# Patient Record
Sex: MALE | Race: BLACK OR AFRICAN AMERICAN | Employment: OTHER | URBAN - METROPOLITAN AREA
[De-identification: names, ages, dates, MRNs, and addresses within clinical notes are randomized per-mention and may not be internally consistent; named-entity substitution may affect disease eponyms.]

---

## 2017-12-07 PROBLEM — M54.42 LEFT-SIDED LOW BACK PAIN WITH LEFT-SIDED SCIATICA: Status: ACTIVE | Noted: 2017-04-21

## 2017-12-07 PROBLEM — R97.20 PSA ELEVATION: Status: ACTIVE | Noted: 2017-12-07

## 2017-12-07 PROBLEM — I45.2 RBBB (RIGHT BUNDLE BRANCH BLOCK WITH LEFT ANTERIOR FASCICULAR BLOCK): Status: ACTIVE | Noted: 2017-12-07

## 2018-03-05 ENCOUNTER — HOSPITAL ENCOUNTER (OUTPATIENT)
Dept: PHYSICAL THERAPY | Age: 73
Discharge: HOME | End: 2018-03-05
Payer: MEDICARE

## 2018-03-05 PROCEDURE — 97110 THERAPEUTIC EXERCISES: CPT

## 2018-03-05 PROCEDURE — G8978 MOBILITY CURRENT STATUS: HCPCS

## 2018-03-05 PROCEDURE — G8979 MOBILITY GOAL STATUS: HCPCS

## 2018-03-05 PROCEDURE — 97161 PT EVAL LOW COMPLEX 20 MIN: CPT

## 2018-03-05 NOTE — PROGRESS NOTES
PT DAILY TREATMENT NOTE/LUMBAR EVAL 3-16    Patient Name: Lacinda Essex  Date:3/5/2018  : 1945  [x]  Patient  Verified  Payor: [place] MEDICARE / Plan: [place] MEDICARE PART A & B / Product Type: Medicare /    In time:323  Out time:405  Total Treatment Time (min): 42  Total Timed Codes (min): 8  1:1 Treatment Time ( only): 8   Visit #: 1 of 10-20    Treatment Area: Pain in left leg [M79.605]  Low back pain [M54.5]  SUBJECTIVE  Pain Level (0-10 scale): 0  []constant [x]intermittent []improving []worsening [x]no change since onset  Worse with getting up from sitting , bending over, Better lying down, better  Any medication changes, allergies to medications, adverse drug reactions, diagnosis change, or new procedure performed?: [x] No    [] Yes (see summary sheet for update)  Subjective functional status/changes:     PLOF: I all areas of ADLs and activities, no AD, household chores and yard work , retired,   Limitations to PLOF:pain  Mechanism of Injury: exacerbation 2-3 months ago , insideous onset  Current symptoms/Complaints: 80 YO male diagnosed as above and with S/S consistent with above diagnosis presents to skilled outpatient PT. CCO foot numbness on the left side and leg cramps on the left side, He states the left side of the body from the waist down to the foot - lateral leg numbness feeling. exacerbation 2-3 months ago , insideous onset.  Pain today is 0/10 Worse with getting up from sitting , bending over, Better lying down, better  Previous Treatment/Compliance:aleve,   PMHx/Surgical Hx: cancer - prostate 2016 proton therapy 40 some treatments, HTN, visual impairment , pneumonia, DM   Work Hx:retired  Living Situation: 2 story house, not alone  Pt Goals: get rid of the numbness and positional back pain  Barriers: [x]pain []financial []time []transportation []other  Motivation: Good  Substance use: []Alcohol []Tobacco []other:   FABQ Score: []low []elevate  Cognition: A & O x 4 Other: OBJECTIVE/EXAMINATION  Domestic Life: retired,   Activity/Recreational Limitations: pain  Mobility: I  Self Care: I        Modality rationale:     Min Type Additional Details    [] Estim:  []Unatt       []IFC  []Premod                        []Other:  []w/ice   []w/heat  Position:  Location:    [] Estim: []Att    []TENS instruct  []NMES                    []Other:  []w/US   []w/ice   []w/heat  Position:  Location:    []  Traction: [] Cervical       []Lumbar                       [] Prone          []Supine                       []Intermittent   []Continuous Lbs:  [] before manual  [] after manual    []  Ultrasound: []Continuous   [] Pulsed                           []1MHz   []3MHz Location:  W/cm2:    []  Iontophoresis with dexamethasone         Location: [] Take home patch   [] In clinic    []  Ice     []  heat  []  Ice massage  []  Laser   []  Anodyne Position:  Location:    []  Laser with stim  []  Other: Position:  Location:    []  Vasopneumatic Device Pressure:       [] lo [] med [] hi   Temperature: [] lo [] med [] hi   [] Skin assessment post-treatment:  []intact []redness- no adverse reaction    []redness - adverse reaction:     34 min [x]Eval                  []Re-Eval       8 min Therapeutic Exercise:  [x] See flow sheet :   Rationale: increase ROM, increase strength and improve coordination to improve the patients ability to aid with increase tolerance to ADLs and activities     min Therapeutic Activity:  []  See flow sheet :   Rationale:   to improve the patients ability to       min Neuromuscular Re-education:  []  See flow sheet :   Rationale:   to improve the patients ability to      min Manual Therapy:     Rationale:  to      min Gait Training:  ___ feet with ___ device on level surfaces with ___ level of assist   Rationale:           With   [] TE   [] TA   [] neuro   [] other: Patient Education: [x] Review HEP    [] Progressed/Changed HEP based on:   [] positioning   [] body mechanics [] transfers   [] heat/ice application    [] other:      Other Objective/Functional Measures: FALLS risk is low, no AD use, no LOB noted    Physical Therapy Evaluation - Lumbar Spine ([facility])    SUBJECTIVE  Chief Complaint:    Mechanism of injury:    Symptoms:  Aggravated by:   [] Bending [] Sitting [] Standing [] Walking   [] Moving [] Cough [] Sneeze [] Valsalva   [] AM  [] PM  Lying:  [] sup   [] pro   [] sidelying   [] Other:     Eased by:    [] Bending [] Sitting [] Standing [] Walking   [] Moving [] AM  [] PM  Lying: [] sup  [] pro  [] sidelying   [] Other:     General Health:  Red Flags Indicated? [] Yes    [] No  [] Yes [] No Recent weight change (If yes, due to dieting?  [] Yes  [] No)   [] Yes [] No Weakness in legs during walking  [] Yes [] No Unremitting pain at night  [] Yes [] No Abdominal pain or problems  [] Yes [] No Rectal bleeding  [] Yes [] No Feet more cold or painful in cold weather  [] Yes [] No Menstrual irregularities  [] Yes [] No Blood or pain with urination  [] Yes [] No Dysfunction of bowel or bladder  [] Yes [] No Recent illness within past 3 weeks (i.e, cold, flu)  [] Yes [] No Numbness/tingling in buttock/genitalia region    Past History/Treatments:     Diagnostic Tests: [] Lab work [] X-rays    [] CT [] MRI     [] Other:  Results:    Functional Status  Prior level of function:As above  Present functional limitations:FOTO  What position do you sleep in?:SL R    OBJECTIVE  Posture:  Lateral Shift: [x] R    [x] L     [] +  [x] -  Kyphosis: [] Increased [] Decreased   []  WNL  Lordosis:  [] Increased [x] Decreased   [] WNL  Pelvic symmetry: [] WNL    [] Other:    Gait:  [] Normal     [] Abnormal:mild anltalgic    Active Movements: [] N/A   [] Too acute   [] Other:  ROM   AROM % PROM Comments:pain, area   Forward flexion 40-60   22cm     Extension 20-30 20 degrees     SB right 20-30 50cm     SB left 20-30 50cm     Rotation right 5-10 75%     Rotation left 5-10 100%       Repeated Movements   Effects on present pain: produces (CO), abolishes (A), increases (incr), decreases (decr), centralizes (C), peripheral (PH), no effect (NE)   Pre-Test Sx Flexion Repeated Flexion Extension Repeated Extension Repeated SBL Repeated SBR   Sitting          Standing          Lying      N/A N/A   Comments:  Side Glide:  Sustained passive positioning test:    Neuro Screen [] WNL  Myotome/Dermatome/Reflexes:  Comments:    Dural Mobility:  SLR Sitting: [] R    [] L    [] +    [] -  @ (degrees):           Supine: [] R    [] L    [] +    [] -  @ (degrees):   Slump Test: [] R    [x] L    [x] +    [] -  @ (degrees):   Prone Knee Bend: [] R    [] L    [] +    [] -     Palpation  No TTP at the B L4-5, B SIJ, B piriformis    [x] Min  [] Mod  [] Severe    Location:right SIJ STC  [] Min  [] Mod  [] Severe    Location:  [] Min  [] Mod  [] Severe    Location:    Strength   L(0-5) R (0-5) N/T   Hip Flexion (L1,2)   []   Knee Extension (L3,4)   []   Ankle Dorsiflexion (L4)   []   Great Toe Extension (L5)   []   Ankle Plantarflexion (S1)   []   Knee Flexion (S1,2)   []   Upper Abdominals   []   Lower Abdominals   []   Paraspinals   []   Back Rotators   []   Gluteus Denis   []   Other   []     Special Tests  Lumbar:  Lumb.  Compression: [] Pos  [] Neg               Lumbar Distraction:   [] Pos  [] Neg    Quadrant:  [] Pos  [] Neg   [] Flex  [] Ext    Sacroilliac:  Gaenslen's: [] R    [] L    [] +    [] -     Compression: [] +    [] -     Gapping:  [] +    [] -     Thigh Thrust: [] R    [] L    [] +    [] -     Leg Length: [] +    [] -   Position:    Crests:    ASIS:    PSIS:    Sacral Sulcus:    Mobility: Standing flex:     Sitting flex:     Supine to sit:     Prone knee bend:         Hip: Peace [place]:  [] R    [] L    [] +    [] -     Scour:  [] R    [] L    [] +    [] -     Piriformis: [] R    [x] L    [x] +    [] -          Deficits: Susanne's: [] R    [] L    [] +    [] -     Tha: [] R    [] L    [] +    [] -     Hamstrings 90/90:    Gastrocsoleus (to neutral): Right: Left:       Global Muscular Weakness:  Abdominals:  Quadratus Lumborum:  Paraspinals: Other:    Other tests/comments:FALLS RISK IS LOW< NO LOB< NO AD USE       Pain Level (0-10 scale) post treatment: 0    ASSESSMENT/Changes in Function: Patient demonstrates the potential to make gains with improved ROM, strength, endurance/activity tolerance, functional FOTO survey score   and all within a reasonable time frame so as to increase their functional independence with ADLs and activities for carryover to  Improved quality of life and tolerance to household chores and community activities. Patient requires skilled Physical Therapy so as to monitor their response to and modify their treatment plan accordingly. Patient appears to be an appropriate candidate for skilled outpatient Physical Therapy. Patient will continue to benefit from skilled PT services to modify and progress therapeutic interventions, address functional mobility deficits, address ROM deficits, address strength deficits, analyze and address soft tissue restrictions, analyze and cue movement patterns, analyze and modify body mechanics/ergonomics, assess and modify postural abnormalities and instruct in home and community integration to attain remaining goals.      [x]  See Plan of Care  []  See progress note/recertification  []  See Discharge Summary         Progress towards goals / Updated goals:       PLAN  [x]  Upgrade activities as tolerated     [x]  Continue plan of care  []  Update interventions per flow sheet       []  Discharge due to:_  []  Other:_      Aminata Larios, PT 3/5/2018  3:25 PM

## 2018-03-05 NOTE — PROGRESS NOTES
[facility] [address], [address] [place] [address], [address] [place], [place] [address]y [address]  [phone] fax      Plan of Care/ Statement of Necessity for Physical Therapy Services    Patient name: Jeff Feliciano Start of Care: 3/5/2018   Referral source: Sage Graves MD : 1945    Medical Diagnosis: Pain in left leg [M79.605]  Low back pain [M54.5]   Onset Date:2-3 months    Treatment Diagnosis: decrease tolerance to ADLs  And activities due to left LE S/S,    Prior Hospitalization: see medical history Provider#: [id number]   Medications: Verified on Patient summary List    Comorbidities: cancer - prostate 2016 proton therapy 40 some treatments, HTN, visual impairment , pneumonia, DM    Prior Level of Function: I all areas of ADLs and activities, no AD, household chores and yard work , retired,       [employer] of Care and following information is based on the information from the initial evaluation. Assessment/ key information:71 YO male diagnosed as above and with S/S consistent with above diagnosis presents to skilled outpatient PT. CCO foot numbness on the left side and leg cramps on the left side, He states the left side of the body from the waist down to the foot - lateral leg numbness feeling. exacerbation 2-3 months ago , insideous onset. Pain today is 0/10 Worse with getting up from sitting , bending over, Better lying down, better  Previous Treatment/Compliance:aleve,   Pain 0 today, FOTO leg 66, lumbar 60 gait I with no AD use, mildly antalgic appearing , + Left piriformis tension and + left sciatic nerve tension testing. No TTP B SIJ, Piriformis, LS, min STC right SIJ, - lateral shift, decreased lordosis, AROM Trunk FF 22 cm from floor, SB B 50cm, BB 20 degrees, ROT Right 75% left 100%.  Falls risk low  Patient demonstrates the potential to make gains with improved ROM, strength, endurance/activity tolerance, functional FOTO survey score   and all within a reasonable time frame so as to increase their functional independence with ADLs and activities for carryover to  Improved quality of life and tolerance to household chores and community activities. Patient requires skilled Physical Therapy so as to monitor their response to and modify their treatment plan accordingly. Patient appears to be an appropriate candidate for skilled outpatient Physical Therapy.       Evaluation Complexity History MEDIUM  Complexity : 1-2 comorbidities / personal factors will impact the outcome/ POC ; Examination MEDIUM Complexity : 3 Standardized tests and measures addressing body structure, function, activity limitation and / or participation in recreation  ;Presentation LOW Complexity : Stable, uncomplicated  ;Clinical Decision Making MEDIUM Complexity : FOTO score of 26-74  Overall Complexity Rating: LOW   Problem List: pain affecting function, decrease ROM, impaired gait/ balance, decrease ADL/ functional abilitiies, decrease activity tolerance, decrease flexibility/ joint mobility, decrease transfer abilities and other FOTO leg 66, lumbar 60   Treatment Plan may include any combination of the following: Therapeutic exercise, Therapeutic activities, Neuromuscular re-education, Physical agent/modality, Manual therapy, Patient education, Self Care training and Home safety training  Patient / Family readiness to learn indicated by: asking questions, trying to perform skills and interest  Persons(s) to be included in education: patient (P)  Barriers to Learning/Limitations: None  Patient Goal (s): get rid of the numbness and positional back pain  Patient Self Reported Health Status: good  Rehabilitation Potential: good    Short Term Goals:  To be accomplished in 5 treatments:   1 patient will have established and be I with HEP to aid with progression of skilled PT program   EVAL issued   CURRENT   2 patient will have lumbar FOTO 62 to show increase tolerance to ADLs and household chores   EVAL 60   CURRENT  Long Term Goals: To be accomplished in 10-20 treatments:            1 patient will have lumbar FOTO 65 to show increase tolerance to ADLs and household chores   EVAL 60   CURRENT    2 patient will ambulate 1/4-1/2 mile with no increase S/S left LE for carryover to community ambulation   EVAL pain with activities   CURRENT   3 patient will report overall 50% improvement to aid with increase tolerance to ADLs and activities   EVAL    CURRENT  Frequency / Duration: Patient to be seen 2-3 times per week for 10-20 treatments. Patient/ Caregiver education and instruction: Diagnosis, prognosis, self care, activity modification and exercises   [x]  Plan of care has been reviewed with PTA    G-Codes (GP)  Mobility   Current  CK= 40-59%   Goal  CJ= 20-39%     The severity rating is based on clinical judgment and the FOTO score. Certification Period: 3/5/18-5/4/18  Chip Noel, PT 3/5/2018 4:10 PM    ________________________________________________________________________    I certify that the above Therapy Services are being furnished while the patient is under my care. I agree with the treatment plan and certify that this therapy is necessary.     [de-identified] Signature:____________________  Date:____________Time: _________    Please sign and return to [facility] [address] [place] [address], [address] [place] [address], [address] [place], [place] [address]y [address] [phone] fax

## 2018-03-07 ENCOUNTER — HOSPITAL ENCOUNTER (OUTPATIENT)
Dept: PHYSICAL THERAPY | Age: 73
Discharge: HOME | End: 2018-03-07
Payer: MEDICARE

## 2018-03-07 PROCEDURE — 97140 MANUAL THERAPY 1/> REGIONS: CPT

## 2018-03-07 PROCEDURE — 97110 THERAPEUTIC EXERCISES: CPT

## 2018-03-07 NOTE — PROGRESS NOTES
PT DAILY TREATMENT NOTE - [facility]     Patient Name: Mike Patel  Date:3/7/2018  : 1945  [x]  Patient  Verified  Payor: [place] MEDICARE / Plan: [place] MEDICARE PART A & B / Product Type: Medicare /    In time:2:50  Out time:3:59  Total Treatment Time (min): 55  Total Timed Codes (min): 45  1:1 Treatment Time ( [address] only): 45  Visit #: 2 of 10-20    Treatment Area: Pain in left leg [M79.605]  Low back pain [M54.5]    SUBJECTIVE  Pain Level (0-10 scale): 2-3  Any medication changes, allergies to medications, adverse drug reactions, diagnosis change, or new procedure performed?: [x] No    [] Yes (see summary sheet for update)  Subjective functional status/changes:   [] No changes reported  Little  Pain.   OBJECTIVE    Modality rationale: decrease edema, decrease inflammation, decrease pain and increase tissue extensibility to improve the patients ability to perform ADL    Min Type Additional Details    [] Estim:  []Unatt       []IFC  []Premod                        []Other:  []w/ice   []w/heat  Position:  Location:    [] Estim: []Att    []TENS instruct  []NMES                    []Other:  []w/US   []w/ice   []w/heat  Position:  Location:    []  Traction: [] Cervical       []Lumbar                       [] Prone          []Supine                       []Intermittent   []Continuous Lbs:  [] before manual  [] after manual    []  Ultrasound: []Continuous   [] Pulsed                           []1MHz   []3MHz W/cm2:  Location:    []  Iontophoresis with dexamethasone         Location: [] Take home patch   [] In clinic   10 [x]  Ice post    []  heat  []  Ice massage  []  Laser   []  Anodyne Position:prone  Location:(B) LSP    []  Laser with stim  []  Other:  Position:  Location:    []  Vasopneumatic Device Pressure:       [] lo [] med [] hi   Temperature: [] lo [] med [] hi   [x] Skin assessment post-treatment:  [x]intact []redness- no adverse reaction    []redness - adverse reaction:      min []Eval []Re-Eval       37  1:1  30 min Therapeutic Exercise:  [x] See flow sheet :   Rationale: increase ROM and increase strength to improve the patients ability to perform ADL      min Therapeutic Activity:  []  See flow sheet :   Rationale:   to improve the patients ability to       min Neuromuscular Re-education:  []  See flow sheet :   Rationale:   to improve the patients ability to     8 min Manual Therapy:  P/A  Mob  (B) TSP/LSP   Rationale: decrease pain, increase ROM, increase tissue extensibility and decrease edema  to      min Gait Training:  ___ feet with ___ device on level surfaces with ___ level of assist   Rationale: With   [x] TE   [] TA   [] neuro   [] other: Patient Education: [x] Review HEP    [] Progressed/Changed HEP based on:   [] positioning   [] body mechanics   [] transfers   [] heat/ice application    [] other:      Other Objective/Functional Measures:  Fair  Response  To each there ex. Pain Level (0-10 scale) post treatment: 0    ASSESSMENT/Changes in Function: Benefited  With treatment. Patient will continue to benefit from skilled PT services to address functional mobility deficits, address ROM deficits, address strength deficits and analyze and address soft tissue restrictions to attain remaining goals. [x]  See Plan of Care  []  See progress note/recertification  []  See Discharge Summary         Progress towards goals / Updated goals:  Short Term Goals: To be accomplished in 5 treatments:                        1 patient will have established and be I with HEP to aid with progression of skilled PT program                        EVAL issued                        CURRENT  Met  3/7/18                        2 patient will have lumbar FOTO 62 to show increase tolerance to ADLs and household chores                        EVAL 60                        CURRENT  Long Term Goals:  To be accomplished in 10-20 treatments:            1 patient will have lumbar FOTO 65 to show increase tolerance to ADLs and household chores                        EVAL 60                        CURRENT                                        2 patient will ambulate 1/4-1/2 mile with no increase S/S left LE for carryover to community ambulation                        EVAL pain with activities                        CURRENT                        3 patient will report overall 50% improvement to aid with increase tolerance to ADLs and activities                        EVAL                         CURRENT  PLAN  []  Upgrade activities as tolerated     [x]  Continue plan of care  []  Update interventions per flow sheet       []  Discharge due to:_  []  Other:_      Karen Goncalves PTA 3/7/2018  3:30 PM    Future Appointments  Date Time Provider Trisha Geller   3/15/2018 2:30 PM Karen Miranda PTA [facility]   3/21/2018 12:00 PM Sharif Byers PT [facility]   3/23/2018 3:30 PM Karen Goncalves, PTA [facility]   3/26/2018 4:00 PM Karen Goncalves, PTA [facility]   3/29/2018 4:00 PM Karen Goncalves, PTA [facility]   [id number]   7/20/2018 2:00 PM Reyna Moralez MD [id number]

## 2018-03-15 ENCOUNTER — HOSPITAL ENCOUNTER (OUTPATIENT)
Dept: PHYSICAL THERAPY | Age: 73
Discharge: HOME | End: 2018-03-15
Payer: MEDICARE

## 2018-03-15 PROCEDURE — 97110 THERAPEUTIC EXERCISES: CPT

## 2018-03-15 NOTE — PROGRESS NOTES
PT DAILY TREATMENT NOTE - [facility]     Patient Name: Sallie Junito  Date:3/15/2018  : 1945  [x]  Patient  Verified  Payor: Tarah Heck / Plan: [place] MED[place] & [place] / Product Type: Medicare /    In time:2:33  Out time:3:30  Total Treatment Time (min): 50  Total Timed Codes (min): 40  1:1 Treatment Time ( [address] only): 40  Visit #: 3 of 10-20    Treatment Area: Pain in left leg [M79.605]  Low back pain [M54.5]    SUBJECTIVE  Pain Level (0-10 scale): 0  Any medication changes, allergies to medications, adverse drug reactions, diagnosis change, or new procedure performed?: [x] No    [] Yes (see summary sheet for update)  Subjective functional status/changes:   [] No changes reported  No pain right now  But  Feel  Tingling  At  (L) 5 toe.     OBJECTIVE    Modality rationale: decrease edema, decrease inflammation, decrease pain and increase tissue extensibility to improve the patients ability to perform ADL   Min Type Additional Details    [] Estim:  []Unatt       []IFC  []Premod                        []Other:  []w/ice   []w/heat  Position:  Location:    [] Estim: []Att    []TENS instruct  []NMES                    []Other:  []w/US   []w/ice   []w/heat  Position:  Location:    []  Traction: [] Cervical       []Lumbar                       [] Prone          []Supine                       []Intermittent   []Continuous Lbs:  [] before manual  [] after manual    []  Ultrasound: []Continuous   [] Pulsed                           []1MHz   []3MHz W/cm2:  Location:    []  Iontophoresis with dexamethasone         Location: [] Take home patch   [] In clinic   10 [x]  Ice  post   []  heat  []  Ice massage  []  Laser   []  Anodyne Position:supine  Location:(B) LSP    []  Laser with stim  []  Other:  Position:  Location:    []  Vasopneumatic Device Pressure:       [] lo [] med [] hi   Temperature: [] lo [] med [] hi   [x] Skin assessment post-treatment:  [x]intact []redness- no adverse reaction    []redness - adverse reaction:      min []Eval                  []Re-Eval       40 min Therapeutic Exercise:  [x] See flow sheet :   Rationale: increase ROM and increase strength to improve the patients ability to      min Therapeutic Activity:  []  See flow sheet :   Rationale:   to improve the patients ability to       min Neuromuscular Re-education:  []  See flow sheet :   Rationale:   to improve the patients ability to      min Manual Therapy:     Rationale: decrease pain, increase ROM, increase tissue extensibility and decrease edema  to perform ADL      min Gait Training:  ___ feet with ___ device on level surfaces with ___ level of assist   Rationale: With   [x] TE   [] TA   [] neuro   [] other: Patient Education: [x] Review HEP    [] Progressed/Changed HEP based on:   [] positioning   [] body mechanics   [] transfers   [] heat/ice application    [] other:      Other Objective/Functional Measures:   completed  Each there ex  Fairly  Well  With cues. Pain Level (0-10 scale) post treatment: 0    ASSESSMENT/Changes in Function: Benefited  With treatment. Patient will continue to benefit from skilled PT services to address functional mobility deficits, address ROM deficits, address strength deficits, analyze and address soft tissue restrictions, analyze and cue movement patterns and instruct in home and community integration to attain remaining goals.      [x]  See Plan of Care  []  See progress note/recertification  []  See Discharge Summary         Progress towards goals / Updated goals:  Short Term Goals: To be accomplished in 5 treatments:                        7 patient will have established and be I with HEP to aid with progression of skilled PT program                        EVAL issued                        [id number]  Met  3/7/18                        7 patient will have lumbar FOTO 62 to show increase tolerance to ADLs and household chores                        EVAL 60                        CURRENT  Long Term Goals: To be accomplished in 10-20 treatments:            9 patient will have lumbar FOTO 65 to show increase tolerance to ADLs and household chores                        EVAL 60                        CURRENT                                        2 patient will ambulate 1/4-1/2 mile with no increase S/S left LE for carryover to community ambulation                        EVAL pain with activities  [phone] patient will report overall 50% improvement to aid with increase tolerance to ADLs and activities                        EVAL                         CURRENT    PLAN  []  Upgrade activities as tolerated     [x]  Continue plan of care  []  Update interventions per flow sheet       []  Discharge due to:_  []  Other:_      Crystal Ivanna, PTA 3/15/2018  2:42 PM    Future Appointments  Date Time Provider Trisha Geller   3/21/2018 12:00 PM Arron Beltran, PT [facility]   3/23/2018 3:30 PM Crystal Ivanna, PTA [facility]   3/26/2018 4:00 PM Crystal Vianna, PTA [facility]   3/29/2018 4:00 PM Crystal Ivanna, PTA [facility]   7/20/2018 1:00 PM [facility]   7/20/2018 2:00 PM Anthony Gutierres MD [address]

## 2018-03-21 ENCOUNTER — HOSPITAL ENCOUNTER (OUTPATIENT)
Dept: PHYSICAL THERAPY | Age: 73
Discharge: HOME | End: 2018-03-21
Payer: MEDICARE

## 2018-03-21 PROCEDURE — 97110 THERAPEUTIC EXERCISES: CPT

## 2018-03-21 NOTE — PROGRESS NOTES
PT DAILY TREATMENT NOTE - [facility]     Patient Name: Lacinda Essex  Date:3/21/2018  : 1945  [x]  Patient  Verified  Payor: [place] MEDICARE / Plan: [place] MEDICARE PART A & B / Product Type: Medicare /    In time: 12:01  Out time:12:50  Total Treatment Time (min): 43  Total Timed Codes (min): 43  1:1 Treatment Time ( [address] only): 37  Visit #: 4 of 10-20    Treatment Area: Pain in left leg [M79.605]  Low back pain [M54.5]    SUBJECTIVE  Pain Level (0-10 scale): 0  Any medication changes, allergies to medications, adverse drug reactions, diagnosis change, or new procedure performed?: [x] No    [] Yes (see summary sheet for update)  Subjective functional status/changes:   [] No changes reported  No pain.     OBJECTIVE    Modality rationale: decrease edema, decrease inflammation, decrease pain and increase tissue extensibility to improve the patients ability to perform ADL    Min Type Additional Details    [] Estim:  []Unatt       []IFC  []Premod                        []Other:  []w/ice   []w/heat  Position:  Location:    [] Estim: []Att    []TENS instruct  []NMES                    []Other:  []w/US   []w/ice   []w/heat  Position:  Location:    []  Traction: [] Cervical       []Lumbar                       [] Prone          []Supine                       []Intermittent   []Continuous Lbs:  [] before manual  [] after manual    []  Ultrasound: []Continuous   [] Pulsed                           []1MHz   []3MHz W/cm2:  Location:    []  Iontophoresis with dexamethasone         Location: [] Take home patch   [] In clinic    []  Ice     []  heat  []  Ice massage  []  Laser   []  Anodyne Position:  Location:    []  Laser with stim  []  Other:  Position:  Location:    []  Vasopneumatic Device Pressure:       [] lo [] med [] hi   Temperature: [] lo [] med [] hi   [x] Skin assessment post-treatment:  [x]intact []redness- no adverse reaction    []redness - adverse reaction:      min []Eval                  []Re-Eval       43 min Therapeutic Exercise:  [] See flow sheet :   Rationale: increase ROM and increase strength to improve the patients ability to perform  ADL      min Therapeutic Activity:  []  See flow sheet :   Rationale:   to improve the patients ability to       min Neuromuscular Re-education:  []  See flow sheet :   Rationale:   to improve the patients ability to      min Manual Therapy:     Rationale: decrease pain, increase ROM, increase tissue extensibility and decrease edema  to perform ADL      min Gait Training:  ___ feet with ___ device on level surfaces with ___ level of assist   Rationale: With   [x] TE   [] TA   [] neuro   [] other: Patient Education: [x] Review HEP    [] Progressed/Changed HEP based on:   [] positioning   [] body mechanics   [] transfers   [] heat/ice application    [] other:      Other Objective/Functional Measures:  Completed   Each there ex  Fairly  well    Pain Level (0-10 scale) post treatment: 0    ASSESSMENT/Changes in Function: Benefited  With treatment. Patient will continue to benefit from skilled PT services to address functional mobility deficits, address ROM deficits, address strength deficits, analyze and address soft tissue restrictions, analyze and cue movement patterns and instruct in home and community integration to attain remaining goals.      [x]  See Plan of Care  []  See progress note/recertification  []  See Discharge Summary         Progress towards goals / Updated goals:  Short Term Goals: To be accomplished in 5 treatments:                        3 patient will have established and be I with HEP to aid with progression of skilled PT program                        EVAL issued                        IYCSWEG  Met  3/7/18                        7 patient will have lumbar FOTO 62 to show increase tolerance to ADLs and household chores                        EVAL 60  [address] be accomplished in 10-20 treatments:            6 patient will have lumbar FOTO 65 to show increase tolerance to ADLs and household chores                        EVAL 60                        CURRENT                                        2 patient will ambulate 1/4-1/2 mile with no increase S/S left LE for carryover to community ambulation                        EVAL pain with activities  [phone] patient will report overall 50% improvement to aid with increase tolerance to ADLs and activities                        EVAL                         CURRENT  PLAN  []  Upgrade activities as tolerated     []  Continue plan of care  []  Update interventions per flow sheet       []  Discharge due to:_  [x]  Other:_trial  TM   NV      Karen Goncalves PTA 3/21/2018  12:16 PM    Future Appointments  Date Time Provider Trisha Geller   3/23/2018 3:30 PM Karen Miranda, PTA [facility]   3/26/2018 4:00 PM Karen Goncalves, PTA [facility]   3/29/2018 4:00 PM Karen Goncalves, PTA [facility]   7/20/2018 1:00 PM [facility] [address]   7/20/2018 2:00 PM Teresita Bean MD [address]

## 2018-03-23 ENCOUNTER — HOSPITAL ENCOUNTER (OUTPATIENT)
Dept: PHYSICAL THERAPY | Age: 73
Discharge: HOME | End: 2018-03-23
Payer: MEDICARE

## 2018-03-23 PROCEDURE — 97110 THERAPEUTIC EXERCISES: CPT

## 2018-03-23 NOTE — PROGRESS NOTES
PT DAILY TREATMENT NOTE     Patient Name: Silvia Enamorado  Date:3/23/2018  : 1945  [x]  Patient  Verified  Payor: [place] / Plan: [place] MEDICARE PART A & B / Product Type: Medicare /    In time: 3:33  Out time:4:43  Total Treatment Time (min): 53  Total  Timed:43  1:1 treatmeent  Time:  43  Visit #: 5 of 10-20    Treatment Area: Pain in left leg [M79.605]  Low back pain [M54.5]    SUBJECTIVE  Pain Level (0-10 scale):0  Any medication changes, allergies to medications, adverse drug reactions, diagnosis change, or new procedure performed?: [x] No    [] Yes (see summary sheet for update)  Subjective functional status/changes:   [] No changes reported  Really  No pain. I  Have a  Little  Twitch  Sometimes.   OBJECTIVE    Modality rationale: decrease edema, decrease inflammation, decrease pain and increase tissue extensibility to improve the patients ability to perform ADL    Min Type Additional Details    [] Estim:  []Unatt       []IFC  []Premod                        []Other:  []w/ice   []w/heat  Position:  Location:    [] Estim: []Att    []TENS instruct  []NMES                    []Other:  []w/US   []w/ice   []w/heat  Position:  Location:    []  Traction: [] Cervical       []Lumbar                       [] Prone          []Supine                       []Intermittent   []Continuous Lbs:  [] before manual  [] after manual    []  Ultrasound: []Continuous   [] Pulsed                           []1MHz   []3MHz W/cm2:  Location:    []  Iontophoresis with dexamethasone         Location: [] Take home patch   [] In clinic   10 []  Ice     [x]  Heat  post  []  Ice massage  []  Laser   []  Anodyne Position:supine  Location:(B) LSP    []  Laser with stim  []  Other:  Position:  Location:    []  Vasopneumatic Device Pressure:       [] lo [] med [] hi   Temperature: [] lo [] med [] hi   [] Skin assessment post-treatment:  []intact []redness- no adverse reaction    []redness - adverse reaction:      min []Eval []Re-Eval       43 min Therapeutic Exercise:  [x] See flow sheet :   Rationale: increase ROM and increase strength to improve the patients ability to perform ADL      min Therapeutic Activity:  []  See flow sheet :   Rationale:   to improve the patients ability to       min Neuromuscular Re-education:  []  See flow sheet :   Rationale:   to improve the patients ability to      min Manual Therapy:     Rationale: decrease pain, increase ROM, increase tissue extensibility and decrease edema  to perform ADL      min Gait Training:  ___ feet with ___ device on level surfaces with ___ level of assist   Rationale: With   [x] TE   [] TA   [] neuro   [] other: Patient Education: [x] Review HEP    [] Progressed/Changed HEP based on:   [] positioning   [] body mechanics   [] transfers   [] heat/ice application    [] other:      Other Objective/Functional Measures: FOTO:  63    Pain Level (0-10 scale) post treatment: 0    ASSESSMENT/Changes in Function: FOTO has improved. Fair  Response  To each there ex. Patient will continue to benefit from skilled PT services to address functional mobility deficits, address ROM deficits, address strength deficits, analyze and address soft tissue restrictions and instruct in home and community integration to attain remaining goals.      [x]  See Plan of Care  []  See progress note/recertification  []  See Discharge Summary         Progress towards goals / Updated goals:  Short Term Goals: To be accomplished in 5 treatments:                        1 patient will have established and be I with HEP to aid with progression of skilled PT program                        EVAL issued                        TNXMLKE  Met  3/7/18                        8 patient will have lumbar FOTO 62 to show increase tolerance to ADLs and household chores                        EVAL 60                        CURRENT 63   3/23/18  Long Term Goals: To be accomplished in 10-20 treatments:            4 patient will have lumbar FOTO 65 to show increase tolerance to ADLs and household chores                        EVAL 60                        CURRENT          63                               2 patient will ambulate 1/4-1/2 mile with no increase S/S left LE for carryover to community ambulation                        EVAL pain with activities  [phone] patient will report overall 50% improvement to aid with increase tolerance to ADLs and activities                        EVAL                         CURRENT    PLAN  []  Upgrade activities as tolerated     []  Continue plan of care  []  Update interventions per flow sheet       []  Discharge due to:_  []  Other:_      Karen Goncalves, PTA 3/23/2018  3:44 PM    Future Appointments  Date Time Provider Trisha Geller   4/2/2018 4:30 PM Nila Perry, PT [facility]   4/5/2018 4:30 PM Nila Perry, PT [facility]   7/20/2018 1:00 PM [facility] [address]   7/20/2018 2:00 PM Rebecca Tirado MD [address] [facility]

## 2018-03-26 ENCOUNTER — APPOINTMENT (OUTPATIENT)
Dept: PHYSICAL THERAPY | Age: 73
End: 2018-03-26
Payer: MEDICARE

## 2018-03-29 ENCOUNTER — APPOINTMENT (OUTPATIENT)
Dept: PHYSICAL THERAPY | Age: 73
End: 2018-03-29
Payer: MEDICARE

## 2018-04-02 ENCOUNTER — HOSPITAL ENCOUNTER (OUTPATIENT)
Dept: PHYSICAL THERAPY | Age: 73
Discharge: HOME | End: 2018-04-02
Payer: MEDICARE

## 2018-04-02 PROCEDURE — 97110 THERAPEUTIC EXERCISES: CPT

## 2018-04-02 PROCEDURE — 97035 APP MDLTY 1+ULTRASOUND EA 15: CPT

## 2018-04-02 NOTE — PROGRESS NOTES
PT DAILY TREATMENT NOTE - [facility]     Patient Name: Jose Manuel Armendariz  Date:2018  : 1945  [x]  Patient  Verified  Payor: [place] MEDICARE / Plan: [place] MEDICARE PART A & B / Product Type: Medicare /    In time:437  Out time:545  Total Treatment Time (min): 62  Total Timed Codes (min): 52  1:1 Treatment Time ( [address] only): 46  Visit #: 6 of 10-    Treatment Area: Pain in left leg [M79.605]  Low back pain [M54.5]    SUBJECTIVE  Pain Level (0-10 scale): 3  Any medication changes, allergies to medications, adverse drug reactions, diagnosis change, or new procedure performed?: [x] No    [] Yes (see summary sheet for update)  Subjective functional status/changes:   [] No changes reported  It's a little bit sore today.      OBJECTIVE    Modality rationale: decrease inflammation, decrease pain and increase tissue extensibility to improve the patients ability to aid with increase tolerance to ADLs and activities   Min Type Additional Details    [] Estim:  []Unatt       []IFC  []Premod                        []Other:  []w/ice   []w/heat  Position:  Location:    [] Estim: []Att    []TENS instruct  []NMES                    []Other:  []w/US   []w/ice   []w/heat  Position:  Location:    []  Traction: [] Cervical       []Lumbar                       [] Prone          []Supine                       []Intermittent   []Continuous Lbs:  [] before manual  [] after manual   8 [x]  Ultrasound: [x]Continuous   [] Pulsed                           [x]1MHz   []3MHz W/cm2:1.3  Location:B LS    []  Iontophoresis with dexamethasone         Location: [] Take home patch   [] In clinic   10 []  Ice     [x]  Heat post  []  Ice massage  []  Laser   []  Anodyne Position:prone  Location:B LS    []  Laser with stim  []  Other:  Position:  Location:    []  Vasopneumatic Device Pressure:       [] lo [] med [] hi   Temperature: [] lo [] med [] hi   [] Skin assessment post-treatment:  []intact []redness- no adverse reaction    []redness - adverse reaction:       min []Eval                  []Re-Eval       44 min Therapeutic Exercise:  [x] See flow sheet :   Rationale: increase ROM, increase strength and improve coordination to improve the patients ability to aid with increase tolerance to ADLs and activities     min Therapeutic Activity:  []  See flow sheet :   Rationale:   to improve the patients ability to       min Neuromuscular Re-education:  []  See flow sheet :   Rationale:   to improve the patients ability to      min Manual Therapy:     Rationale:  to      min Gait Training:  ___ feet with ___ device on level surfaces with ___ level of assist   Rationale: With   [] TE   [] TA   [] neuro   [] other: Patient Education: [x] Review HEP    [] Progressed/Changed HEP based on:   [] positioning   [] body mechanics   [] transfers   [] heat/ice application    [] other:      Other Objective/Functional Measures: VC exercises and tech     Pain Level (0-10 scale) post treatment: 0-1    ASSESSMENT/Changes in Function: kan well with B LS pain today. Tolerated all exercises well. Patient will continue to benefit from skilled PT services to modify and progress therapeutic interventions, address functional mobility deficits, address ROM deficits, address strength deficits, analyze and address soft tissue restrictions, analyze and cue movement patterns, analyze and modify body mechanics/ergonomics, assess and modify postural abnormalities and instruct in home and community integration to attain remaining goals.      [x]  See Plan of Care  []  See progress note/recertification  []  See Discharge Summary         Progress towards goals / Updated goals:   Short Term Goals: To be accomplished in 5 treatments:                        9 patient will have established and be I with HEP to aid with progression of skilled PT program                        EVAL issued                        AGPWEGE  Met  3/7/18  4/2/18                        2 patient will have lumbar FOTO 62 to show increase tolerance to ADLs and household chores                        EVAL 60                        CURRENT 63   3/23/18  Long Term Goals: To be accomplished in 10-20 treatments:            1 patient will have lumbar FOTO 65 to show increase tolerance to ADLs and household chores                        EVAL 60                        CURRENT          63                               2 patient will ambulate 1/4-1/2 mile with no increase S/S left LE for carryover to community ambulation                        EVAL pain with activities                        CURRENT   . 14 miles in 5 minutes speed 1.8  4/2/18                        3 patient will report overall 50% improvement to aid with increase tolerance to ADLs and activities                        EVAL                         CURRENT       PLAN  [x]  Upgrade activities as tolerated     [x]  Continue plan of care  []  Update interventions per flow sheet       []  Discharge due to:_  []  Other:_      Alicia Garcia, PT 4/2/2018  4:50 PM    Future Appointments  Date Time Provider Trisha Geller   4/5/2018 4:30 PM Karen Miranda PTA [facility]   7/20/2018 1:00 PM Alon Barros   7/20/2018 2:00 PM Jaime Rosenthal MD [address] [facility]

## 2018-04-05 ENCOUNTER — HOSPITAL ENCOUNTER (OUTPATIENT)
Dept: PHYSICAL THERAPY | Age: 73
Discharge: HOME | End: 2018-04-05
Payer: MEDICARE

## 2018-04-05 PROCEDURE — 97110 THERAPEUTIC EXERCISES: CPT

## 2018-04-05 PROCEDURE — 97035 APP MDLTY 1+ULTRASOUND EA 15: CPT

## 2018-04-05 NOTE — PROGRESS NOTES
PT DAILY TREATMENT NOTE - [facility]     Patient Name: Mayank Enter  Date:2018  : 1945  [x]  Patient  Verified  Payor: [place] MEDICARE / Plan: [place] MEDICARE PART A & B / Product Type: Medicare /    In time: 4:38  Out time: 5:26  Total Treatment Time (min): 48  Total Timed Codes (min): 48  1:1 Treatment Time ( [address] only): 48  Visit #: 7 of 10-20    Treatment Area: Pain in left leg [M79.605]  Low back pain [M54.5]    SUBJECTIVE  Pain Level (0-10 scale): 3  Any medication changes, allergies to medications, adverse drug reactions, diagnosis change, or new procedure performed?: [x] No    [] Yes (see summary sheet for update)  Subjective functional status/changes:   [] No changes reported  Still some pain.     OBJECTIVE    Modality rationale: decrease edema, decrease inflammation, decrease pain and increase tissue extensibility to improve the patients ability to perform ADL    Min Type Additional Details    [] Estim:  []Unatt       []IFC  []Premod                        []Other:  []w/ice   []w/heat  Position:  Location:    [] Estim: []Att    []TENS instruct  []NMES                    []Other:  []w/US   []w/ice   []w/heat  Position:  Location:    []  Traction: [] Cervical       []Lumbar                       [] Prone          []Supine                       []Intermittent   []Continuous Lbs:  [] before manual  [] after manual   8 [x]  Ultrasound: [x]Continuous   [] Pulsed                           []1MHz   []3MHz W/cm2:1.3  Location:across  LSB    []  Iontophoresis with dexamethasone         Location: [] Take home patch   [] In clinic    []  Ice     []  heat  []  Ice massage  []  Laser   []  Anodyne Position:  Location:    []  Laser with stim  []  Other:  Position:  Location:    []  Vasopneumatic Device Pressure:       [] lo [] med [] hi   Temperature: [] lo [] med [] hi   [x] Skin assessment post-treatment:  [x]intact []redness- no adverse reaction    []redness - adverse reaction:      min []Eval []Re-Eval       40 min Therapeutic Exercise:  [x] See flow sheet :   Rationale: increase ROM and increase strength to improve the patients ability to perform ADL     min Therapeutic Activity:  []  See flow sheet :   Rationale:   to improve the patients ability to       min Neuromuscular Re-education:  []  See flow sheet :   Rationale:   to improve the patients ability to      min Manual Therapy:     Rationale: decrease pain, increase ROM, increase tissue extensibility and decrease edema  to perform ADL      min Gait Training:  ___ feet with ___ device on level surfaces with ___ level of assist   Rationale: With   [x] TE   [] TA   [] neuro   [] other: Patient Education: [x] Review HEP    [] Progressed/Changed HEP based on:   [] positioning   [] body mechanics   [] transfers   [] heat/ice application    [] other:      Other Objective/Functional Measures:  Discuss  With pt  On importance  Of  HEP    Pain Level (0-10 scale) post treatment: 0    ASSESSMENT/Changes in Function: Fair  Response  To each there ex. Patient will continue to benefit from skilled PT services to address functional mobility deficits, address ROM deficits, address strength deficits, analyze and address soft tissue restrictions, analyze and cue movement patterns and instruct in home and community integration to attain remaining goals.      [x]  See Plan of Care  []  See progress note/recertification  []  See Discharge Summary         Progress towards goals / Updated goals:  Short Term Goals: To be accomplished in 5 treatments:                        7 patient will have established and be I with HEP to aid with progression of skilled PT program                        EVAL issued                        BWASKYB  Met  3/7/18  4/2/18                        2 patient will have lumbar FOTO 62 to show increase tolerance to ADLs and household chores                        EVAL 60                        CURRENT 63 [address] be accomplished in 10-20 treatments:            1 patient will have lumbar FOTO 65 to show increase tolerance to ADLs and household chores                        EVAL 60                        CURRENT          63                               2 patient will ambulate 1/4-1/2 mile with no increase S/S left LE for carryover to community ambulation                        EVAL pain with activities                        CURRENT   . 14 miles in 5 minutes speed 1.8  4/2/18                        3 patient will report overall 50% improvement to aid with increase tolerance to ADLs and activities                        EVAL                         CURRENT       PLAN  []  Upgrade activities as tolerated     [x]  Continue plan of care  []  Update interventions per flow sheet       []  Discharge due to:_  []  Other:_      Gerard Benoit PTA 4/5/2018  4:43 PM    Future Appointments  Date Time Provider Trisha Geller   7/20/2018 1:00 PM Alon Barros   7/20/2018 2:00 PM Benoit Gallagher MD [id number] [facility]

## 2018-04-11 ENCOUNTER — HOSPITAL ENCOUNTER (OUTPATIENT)
Dept: PHYSICAL THERAPY | Age: 73
Discharge: HOME | End: 2018-04-11
Payer: MEDICARE

## 2018-04-11 PROCEDURE — 97110 THERAPEUTIC EXERCISES: CPT

## 2018-04-11 PROCEDURE — 97035 APP MDLTY 1+ULTRASOUND EA 15: CPT

## 2018-04-11 NOTE — PROGRESS NOTES
PT DAILY TREATMENT NOTE - [facility]     Patient Name: Mayank Enter  Date:2018  : 1945  [x]  Patient  Verified  Payor: Maryam Mendoza / Plan: [place] MEDICARE PART A & B / Product Type: Medicare /    In time:3:00  Out time: 3:40  Total Treatment Time (min): 40  Total Timed Codes (min): 38  1:1 Treatment Time ( [address] only): 38  Visit #: 8 of 10    Treatment Area: Pain in left leg [M79.605]  Low back pain [M54.5]    SUBJECTIVE  Pain Level (0-10 scale): 3/10  Any medication changes, allergies to medications, adverse drug reactions, diagnosis change, or new procedure performed?: [x] No    [] Yes (see summary sheet for update)  Subjective functional status/changes:   [] No changes reported  Ill let you know how well the therapy is helping once I finish cutting the grass today. OBJECTIVE    Modality rationale: decrease edema, decrease inflammation, decrease pain, increase tissue extensibility and increase muscle contraction/control to improve the patients ability to return to his enjoyable lifestyle.    Min Type Additional Details    [] Estim:  []Unatt       []IFC  []Premod                        []Other:  []w/ice   []w/heat  Position:  Location:    [] Estim: []Att    []TENS instruct  []NMES                    []Other:  []w/US   []w/ice   []w/heat  Position:  Location:    []  Traction: [] Cervical       []Lumbar                       [] Prone          []Supine                       []Intermittent   []Continuous Lbs:  [] before manual  [] after manual   8 [x]  Ultrasound: [x]Continuous   [] Pulsed                           [x]1MHz   []3MHz W/cm2:1.5  Location:LSP    []  Iontophoresis with dexamethasone         Location: [] Take home patch   [] In clinic    []  Ice     []  heat  []  Ice massage  []  Laser   []  Anodyne Position:  Location:    []  Laser with stim  []  Other:  Position:  Location:    []  Vasopneumatic Device Pressure:       [] lo [] med [] hi   Temperature: [] lo [] med [] hi   [x] Skin assessment post-treatment:  [x]intact [x]redness- no adverse reaction    []redness - adverse reaction:       30 min Therapeutic Exercise:  [x] See flow sheet :   Rationale: increase ROM, increase strength, improve coordination and improve balance to improve the patients ability to return to independece          With   [] TE   [] TA   [] neuro   [x] other: Patient Education: [x] Review HEP    [] Progressed/Changed HEP based on:   [] positioning   [x] body mechanics   [] transfers   [] heat/ice application    [x] other: Compliance with HEP     Other Objective/Functional Measures:  Pt with tight hamstrings L>R. Pain Level (0-10 scale) post treatment:  3/10    ASSESSMENT/Changes in Function: Progressing toward goals slowly, he does report some inconsistency with HEP. Patient will continue to benefit from skilled PT services to address functional mobility deficits, address ROM deficits, address strength deficits, analyze and address soft tissue restrictions, analyze and modify body mechanics/ergonomics and assess and modify postural abnormalities to attain remaining goals.      [x]  See Plan of Care  []  See progress note/recertification  []  See Discharge Summary         Progress towards goals / Updated goals:  Short Term Goals: To be accomplished in 5 treatments:                        9 patient will have established and be I with HEP to aid with progression of skilled PT program                        EVAL issued                        OLPUTXS  Met  3/7/18  4/2/18                        2 patient will have lumbar FOTO 62 to show increase tolerance to ADLs and household chores                        EVAL 60                        CURRENT 63   3/23/18  [address] [facility] [address] be accomplished in 10-20 treatments:            9 patient will have lumbar FOTO 65 to show increase tolerance to ADLs and household chores                        EVAL 60                        CURRENT          63                               2 patient will ambulate 1/4-1/2 mile with no increase S/S left LE for carryover to community ambulation                        EVAL pain with activities                        UMYSGSN   .14 miles in 5 minutes speed 1.8  4/2/18                        3 patient will report overall 50% improvement to aid with increase tolerance to ADLs and activities                        EVAL                         CURRENT    PLAN  [x]  Upgrade activities as tolerated     [x]  Continue plan of care  []  Update interventions per flow sheet       []  Discharge due to:_  []  Other:_      Zehrapatricio Pop 4/11/2018  3:11 PM    Future Appointments  Date Time Provider Trisha Geller   4/13/2018 3:00 PM Lindyangie Lord [facility]   4/16/2018 4:00 PM Crystal Ivanna, PTA [facility]   4/18/2018 4:30 PM Crystal Ivanna, PTA [facility]   4/23/2018 3:30 PM Crystal Ivanna, PTA [facility]   4/25/2018 3:30 PM Crystal Ivanna, PTA [facility]   7/20/2018 1:00 PM [facility] KIRILL [facility] HELEN SCHED   7/20/2018 2:00 PM Rajeev Reeder MD [id number]

## 2018-04-13 ENCOUNTER — HOSPITAL ENCOUNTER (OUTPATIENT)
Dept: PHYSICAL THERAPY | Age: 73
Discharge: HOME | End: 2018-04-13
Payer: MEDICARE

## 2018-04-13 PROCEDURE — 97110 THERAPEUTIC EXERCISES: CPT

## 2018-04-13 NOTE — PROGRESS NOTES
PT DAILY TREATMENT NOTE - [facility]     Patient Name: Gerhardt Puls  Date:2018  : 1945  [x]  Patient  Verified  Payor: Robert Pore / Plan: [place] MEDICARE PART A & B / Product Type: Medicare /    In time:3:15  Out time:3:50  Total Treatment Time (min): 35  Total Timed Codes (min): 35  1:1 Treatment Time ( [address] only): 35  Visit #: 9 of 10    Treatment Area: Pain in left leg [M79.605]  Low back pain [M54.5]    SUBJECTIVE  Pain Level (0-10 scale): 3-4  Any medication changes, allergies to medications, adverse drug reactions, diagnosis change, or new procedure performed?: [x] No    [] Yes (see summary sheet for update)  Subjective functional status/changes:   [] No changes reported  I think the therapy is helping a lot with the pain. OBJECTIVE    Modality rationale: decrease edema, decrease inflammation and decrease pain to improve the patients ability to return to normal activities.    Min Type Additional Details    [] Estim:  []Unatt       []IFC  []Premod                        []Other:  []w/ice   []w/heat  Position:  Location:    [] Estim: []Att    []TENS instruct  []NMES                    []Other:  []w/US   []w/ice   []w/heat  Position:  Location:    []  Traction: [] Cervical       []Lumbar                       [] Prone          []Supine                       []Intermittent   []Continuous Lbs:  [] before manual  [] after manual   8 [x]  Ultrasound: [x]Continuous   [] Pulsed                           [x]1MHz   []3MHz W/cm2:1.5  Location:LB    []  Iontophoresis with dexamethasone         Location: [] Take home patch   [] In clinic    []  Ice     []  heat  []  Ice massage  []  Laser   []  Anodyne Position:  Location:    []  Laser with stim  []  Other:  Position:  Location:    []  Vasopneumatic Device Pressure:       [] lo [] med [] hi   Temperature: [] lo [] med [] hi   [x] Skin assessment post-treatment:  [x]intact [x]redness- no adverse reaction    []redness - adverse reaction:         27 min Therapeutic Exercise:  [x] See flow sheet :   Rationale: increase ROM, increase strength and improve coordination to improve the patients ability to return to normal lifestyle. With   [] TE   [] TA   [] neuro   [x] other: Patient Education: [x] Review HEP    [] Progressed/Changed HEP based on:   [] positioning   [x] body mechanics   [] transfers   [] heat/ice application    [x] other:   HEP and compliance     Other Objective/Functional Measures:  Increased forward flexion with seated forward flexion    Pain Level (0-10 scale) post treatment: 2-3/10    ASSESSMENT/Changes in Function: slowly making gains towards LTGs. Patient will continue to benefit from skilled PT services to address functional mobility deficits, address ROM deficits, address strength deficits and analyze and modify body mechanics/ergonomics to attain remaining goals.      []  See Plan of Care  []  See progress note/recertification  []  See Discharge Summary         Progress towards goals / Updated goals:    Short Term Goals: To be accomplished in 5 treatments:                        7 patient will have established and be I with HEP to aid with progression of skilled PT program                        EVAL issued                        TCLQLZR  Met  3/7/18  4/2/18                        2 patient will have lumbar FOTO 62 to show increase tolerance to ADLs and household chores                        EVAL 60                        CURRENT 63   3/23/18  [address] be accomplished in 10-20 treatments:            6 patient will have lumbar FOTO 65 to show increase tolerance to ADLs and household chores                        EVAL 60                        CURRENT          63                               2 patient will ambulate 1/4-1/2 mile with no increase S/S left LE for carryover to community ambulation                        EVAL pain with activities                        CURRENT   .14 miles in 5 minutes speed 1.8  4/2/18                        3 patient will report overall 50% improvement to aid with increase tolerance to ADLs and activities                        EVAL                         CURRENT     PLAN  [x]  Upgrade activities as tolerated     [x]  Continue plan of care  []  Update interventions per flow sheet       []  Discharge due to:_  []  Other:_      Dawson Faviola 4/13/2018  3:18 PM    Future Appointments  Date Time Provider Trisha Geller   4/16/2018 4:00 PM Crystal [place], PTA [facility]   4/18/2018 4:30 PM Crystal Ivanna, PTA [facility]   4/23/2018 3:30 PM Crystal Ivanna, PTA [facility]   4/25/2018 3:30 PM Crystal Ivanna, PTA [facility]   7/20/2018 1:00 PM [facility] [address]   7/20/2018 2:00 PM Emma Portillo MD [address]

## 2018-04-16 ENCOUNTER — HOSPITAL ENCOUNTER (OUTPATIENT)
Dept: PHYSICAL THERAPY | Age: 73
Discharge: HOME | End: 2018-04-16
Payer: MEDICARE

## 2018-04-16 PROCEDURE — 97035 APP MDLTY 1+ULTRASOUND EA 15: CPT

## 2018-04-16 PROCEDURE — 97110 THERAPEUTIC EXERCISES: CPT

## 2018-04-16 NOTE — PROGRESS NOTES
PT DAILY TREATMENT NOTE - [facility]     Patient Name: Shruti Calloway  Date:2018  : 1945  [x]  Patient  Verified  Payor: Obed Fraction / Plan: [place] MEDICARE PART A & B / Product Type: Medicare /    In time:4:18  Out time:5:25  Total Treatment Time (min): 48  Total Timed Codes (min): 48  1:1 Treatment Time ( [address] only): 48  Visit #: 10 of 10    Treatment Area: Pain in left leg [M79.605]  Low back pain [M54.5]    SUBJECTIVE  Pain Level (0-10 scale):2-3  Any medication changes, allergies to medications, adverse drug reactions, diagnosis change, or new procedure performed?: [x] No    [] Yes (see summary sheet for update)  Subjective functional status/changes:   [] No changes reported  Pain across my back.     OBJECTIVE    Modality rationale: decrease edema, decrease inflammation, decrease pain and increase tissue extensibility to improve the patients ability to perform ADL    Min Type Additional Details    [] Estim:  []Unatt       []IFC  []Premod                        []Other:  []w/ice   []w/heat  Position:  Location:    [] Estim: []Att    []TENS instruct  []NMES                    []Other:  []w/US   []w/ice   []w/heat  Position:  Location:    []  Traction: [] Cervical       []Lumbar                       [] Prone          []Supine                       []Intermittent   []Continuous Lbs:  [] before manual  [] after manual   8 [x]  Ultrasound: [x]Continuous   [] Pulsed                           [x]1MHz   []3MHz W/cm2:1.3  Location:(B)LSP    []  Iontophoresis with dexamethasone         Location: [] Take home patch   [] In clinic    []  Ice     []  heat  []  Ice massage  []  Laser   []  Anodyne Position:  Location:    []  Laser with stim  []  Other:  Position:  Location:    []  Vasopneumatic Device Pressure:       [] lo [] med [] hi   Temperature: [] lo [] med [] hi   [x] Skin assessment post-treatment:  [x]intact []redness- no adverse reaction    []redness - adverse reaction:      min []Eval []Re-Eval       40 min Therapeutic Exercise:  [x] See flow sheet :   Rationale: increase ROM and increase strength to improve the patients ability to perform ADL      min Therapeutic Activity:  []  See flow sheet :   Rationale:   to improve the patients ability to       min Neuromuscular Re-education:  []  See flow sheet :   Rationale:   to improve the patients ability to      min Manual Therapy:     Rationale: decrease pain, increase ROM, increase tissue extensibility and decrease edema  to perform ADL      min Gait Training:  ___ feet with ___ device on level surfaces with ___ level of assist   Rationale: With   [x] TE   [] TA   [] neuro   [] other: Patient Education: [x] Review HEP    [] Progressed/Changed HEP based on:   [] positioning   [] body mechanics   [] transfers   [] heat/ice application    [] other: REASSESS  GOALS     Other Objective/Functional Measures: FOTO:60    Pain Level (0-10 scale) post treatment: 0    ASSESSMENT/Changes in Function: Mr Rodgers  Reports  50%  Improvement. FOTO has  Decreased  Slightly. Patient will continue to benefit from skilled PT services to address functional mobility deficits, address ROM deficits, address strength deficits, analyze and address soft tissue restrictions, analyze and cue movement patterns and instruct in home and community integration to attain remaining goals.      [x]  See Plan of Care  []  See progress note/recertification  []  See Discharge Summary         Progress towards goals / Updated goals:  Short Term Goals: To be accomplished in 5 treatments:                        0 patient will have established and be I with HEP to aid with progression of skilled PT program                        EVAL issued                        [id number]  Met  3/7/18  4/2/18                        2 patient will have lumbar FOTO 62 to show increase tolerance to ADLs and household chores                        EVAL 60                        CURRENT 60  4 /16/18  Long Term Goals: To be accomplished in 10-20 treatments:            7 patient will have lumbar FOTO 65 to show increase tolerance to ADLs and household chores                        EVAL 60                        CURRENT          60  4/16/18                             2 patient will ambulate 1/4-1/2 mile with no increase S/S left LE for carryover to community ambulation                        EVAL pain with activities                        CURRENT   .14 miles in 5 minutes speed 1.8  4/2/18                        3 patient will report overall 50% improvement to aid with increase tolerance to ADLs and activities                        EVAL                         CURRENT  50%  Improvement      PLAN  []  Upgrade activities as tolerated     [x]  Continue plan of care  []  Update interventions per flow sheet       []  Discharge due to:_  [x]  Other:_FAX  MD NOTE      Colette Spencre PTA 4/16/2018  4:41 PM    Future Appointments  Date Time Provider Trisha Geller   4/18/2018 4:30 PM Karen Miranda PTA [facility]   4/23/2018 3:30 PM Karen Goncalves PTA [facility]   4/25/2018 3:30 PM Karen Goncalves PTA [facility]   7/20/2018 1:00 PM [facility] KIRILL [facility] HELEN SCHED   7/20/2018 2:00 PM Lisa Shin MD [address]

## 2018-04-18 ENCOUNTER — HOSPITAL ENCOUNTER (OUTPATIENT)
Dept: PHYSICAL THERAPY | Age: 73
Discharge: HOME | End: 2018-04-18
Payer: MEDICARE

## 2018-04-18 PROCEDURE — 97110 THERAPEUTIC EXERCISES: CPT

## 2018-04-18 NOTE — PROGRESS NOTES
PT DAILY TREATMENT NOTE - [facility]     Patient Name: Lashay Chavez  Date:2018  : 1945  [x]  Patient  Verified  Payor: [place] MEDICARE / Plan: [place] MEDICARE PART A & B / Product Type: Medicare /    In time:4:40  Out time:5:17  Total Treatment Time (min): 37  Total Timed Codes (min): 37  1:1 Treatment Time ( [address] only): 40  Visit #: 11 of 20    Treatment Area: Pain in left leg [M79.605]  Low back pain [M54.5]    SUBJECTIVE  Pain Level (0-10 scale): 3  Any medication changes, allergies to medications, adverse drug reactions, diagnosis change, or new procedure performed?: [x] No    [] Yes (see summary sheet for update)  Subjective functional status/changes:   [] No changes reported  Mowed the  Paresh Bryant  On yesterday.     OBJECTIVE    Modality rationale: decrease edema, decrease inflammation, decrease pain and increase tissue extensibility to improve the patients ability to perform ADL    Min Type Additional Details    [] Estim:  []Unatt       []IFC  []Premod                        []Other:  []w/ice   []w/heat  Position:  Location:    [] Estim: []Att    []TENS instruct  []NMES                    []Other:  []w/US   []w/ice   []w/heat  Position:  Location:    []  Traction: [] Cervical       []Lumbar                       [] Prone          []Supine                       []Intermittent   []Continuous Lbs:  [] before manual  [] after manual    []  Ultrasound: []Continuous   [] Pulsed                           []1MHz   []3MHz W/cm2:  Location:    []  Iontophoresis with dexamethasone         Location: [] Take home patch   [] In clinic    []  Ice     []  heat  []  Ice massage  []  Laser   []  Anodyne Position:  Location:    []  Laser with stim  []  Other:  Position:  Location:    []  Vasopneumatic Device Pressure:       [] lo [] med [] hi   Temperature: [] lo [] med [] hi   [x] Skin assessment post-treatment:  [x]intact []redness- no adverse reaction    []redness - adverse reaction:      min []Eval []Re-Eval       37 min Therapeutic Exercise:  [x] See flow sheet :   Rationale: increase ROM and increase strength to improve the patients ability to perform ADL     min Therapeutic Activity:  []  See flow sheet :   Rationale:   to improve the patients ability to       min Neuromuscular Re-education:  []  See flow sheet :   Rationale  to improve the patients ability to      min Manual Therapy:     Rationale: decrease pain, increase ROM, increase tissue extensibility and decrease edema  to perform  ADL      min Gait Training:  ___ feet with ___ device on level surfaces with ___ level of assist   Rationale: With   [x] TE   [] TA   [] neuro   [] other: Patient Education: [x] Review HEP    [] Progressed/Changed HEP based on:   [] positioning   [] body mechanics   [] transfers   [] heat/ice application    [] other:      Other Objective/Functional Measures:  TM   .21 miles    Pain Level (0-10 scale) post treatment: 0    ASSESSMENT/Changes in Function: Fair  Response  To each there ex.pain was resolved  Following there ex. Patient will continue to benefit from skilled PT services to address functional mobility deficits, address ROM deficits, address strength deficits, analyze and address soft tissue restrictions, analyze and cue movement patterns and instruct in home and community integration to attain remaining goals.      [x]  See Plan of Care  []  See progress note/recertification  []  See Discharge Summary         Progress towards goals / Updated goals:  Short Term Goals: To be accomplished in 5 treatments:                        1 patient will have established and be I with HEP to aid with progression of skilled PT program                        EVAL issued                        DQNBUFU  Met  3/7/18  4/2/18                        2 patient will have lumbar FOTO 62 to show increase tolerance to ADLs and household chores                        EVAL 60                        CURRENT 60  4 /16/18  Long Term Goals: To be accomplished in 10-20 treatments:            1 patient will have lumbar FOTO 65 to show increase tolerance to ADLs and household chores                        EVAL 60                        CURRENT          60  4/16/18                             2 patient will ambulate 1/4-1/2 mile with no increase S/S left LE for carryover to community ambulation                        EVAL pain with activities                        XVXGUQU   .21  4/18/18                        3 patient will report overall 50% improvement to aid with increase tolerance to ADLs and activities                        EVAL                         CURRENT  50%  Improvement      PLAN  []  Upgrade activities as tolerated     []  Continue plan of care  []  Update interventions per flow sheet       []  Discharge due to:_  []  Other:_      Karen Goncalves PTA 4/18/2018  4:46 PM    Future Appointments  Date Time Provider Trisha Geller   4/23/2018 3:30 PM Karen Miranda PTA [facility]   4/25/2018 3:30 PM Karen Goncalves PTA [facility]   7/20/2018 1:00 PM [facility] KIRILL Jensen [facility] HELEN SCHED   7/20/2018 2:00 PM MD Kathryn Pond

## 2018-04-23 ENCOUNTER — HOSPITAL ENCOUNTER (OUTPATIENT)
Dept: PHYSICAL THERAPY | Age: 73
Discharge: HOME | End: 2018-04-23
Payer: MEDICARE

## 2018-04-23 PROCEDURE — 97110 THERAPEUTIC EXERCISES: CPT

## 2018-04-23 NOTE — PROGRESS NOTES
PT DAILY TREATMENT NOTE - [facility]     Patient Name: Ashish Rosario  Date:2018  : 1945  [x]  Patient  Verified  Payor: VA MEDICARE / Plan: VA MEDICARE PART A & B / Product Type: Medicare /    In time:  3:35 Out time:4:23  Total Treatment Time (min): 42  Total Timed Codes (min): 42  1:1 Treatment Time ( [address] only): 32  Visit #: 12 of 20    Treatment Area: Pain in left leg [M79.605]  Low back pain [M54.5]    SUBJECTIVE  Pain Level (0-10 scale):0  Any medication changes, allergies to medications, adverse drug reactions, diagnosis change, or new procedure performed?: [x] No    [] Yes (see summary sheet for update)  Subjective functional status/changes:   [] No changes reported  No pain.     OBJECTIVE    Modality rationale: decrease edema, decrease inflammation, decrease pain and increase tissue extensibility to improve the patients ability to perform ADL    Min Type Additional Details    [] Estim:  []Unatt       []IFC  []Premod                        []Other:  []w/ice   []w/heat  Position:  Location:    [] Estim: []Att    []TENS instruct  []NMES                    []Other:  []w/US   []w/ice   []w/heat  Position:  Location:    []  Traction: [] Cervical       []Lumbar                       [] Prone          []Supine                       []Intermittent   []Continuous Lbs:  [] before manual  [] after manual    []  Ultrasound: []Continuous   [] Pulsed                           []1MHz   []3MHz W/cm2:  Location:    []  Iontophoresis with dexamethasone         Location: [] Take home patch   [] In clinic    []  Ice     []  heat  []  Ice massage  []  Laser   []  Anodyne Position:  Location:    []  Laser with stim  []  Other:  Position:  Location:    []  Vasopneumatic Device Pressure:       [] lo [] med [] hi   Temperature: [] lo [] med [] hi   [x] Skin assessment post-treatment:  [x]intact []redness- no adverse reaction    []redness - adverse reaction:      min []Eval                  []Re-Eval       42  1:1  32 min Therapeutic Exercise:  [x] See flow sheet :   Rationale: increase ROM and increase strength to improve the patients ability to perform ADL      min Therapeutic Activity:  []  See flow sheet :   Rationale:   to improve the patients ability to       min Neuromuscular Re-education:  []  See flow sheet :   Rationale:   to improve the patients ability      min Manual Therapy:     Rationale: decrease pain, increase ROM, increase tissue extensibility and decrease edema  to perform ADL      min Gait Training:  ___ feet with ___ device on level surfaces with ___ level of assist   Rationale: With   [x] TE   [] TA   [] neuro   [] other: Patient Education: [x] Review HEP    [] Progressed/Changed HEP based on:   [] positioning   [] body mechanics   [] transfers   [] heat/ice application    [] other:      Other Objective/Functional Measures:  Completed   Each there ex  Fairly well. Pain Level (0-10 scale) post treatment: 0    ASSESSMENT/Changes in Function: Benefited  With treatment. Patient will continue to benefit from skilled PT services to address functional mobility deficits, address ROM deficits, address strength deficits, analyze and address soft tissue restrictions and analyze and cue movement patterns to attain remaining goals.      [x]  See Plan of Care  []  See progress note/recertification  []  See Discharge Summary         Progress towards goals / Updated goals:  Short Term Goals: To be accomplished in 5 treatments:                        9 patient will have established and be I with HEP to aid with progression of skilled PT program                        EVAL issued                        IBVXFAQ  Met  3/7/18  4/2/18                        2 patient will have lumbar FOTO 62 to show increase tolerance to ADLs and household chores                        EVAL 60                        CURRENT 60  4 /16/18  [address] be accomplished in 10-20 treatments:            1 patient will have lumbar FOTO 65 to show increase tolerance to ADLs and household chores                        EVAL 60                        CURRENT          60  4/16/18                             2 patient will ambulate 1/4-1/2 mile with no increase S/S left LE for carryover to community ambulation                        EVAL pain with activities                        UHIGGKA   .21  4/18/18                        3 patient will report overall 50% improvement to aid with increase tolerance to ADLs and activities                        EVAL                         CURRENT  50%  Improvement      PLAN  []  Upgrade activities as tolerated     [x]  Continue plan of care  []  Update interventions per flow sheet       []  Discharge due to:_  []  Other:_      Karen Goncalves, PTA 4/23/2018  4:19 PM    Future Appointments  Date Time Provider Trisha Geller   4/25/2018 3:30 PM Karen Miranda, PTA [facility]   4/30/2018 2:30 PM Karen Goncalves, PTA [facility]   5/2/2018 4:00 PM Karen Goncalves, PTA [facility]   5/8/2018 3:30 PM Lisa Samuel, PT [facility]   5/10/2018 3:30 PM Lisa Samuel, PT [facility]   5/15/2018 3:30 PM Lisette Ruano, PT [facility]   5/17/2018 3:00 PM Lisa Samuel PT [facility]   5/22/2018 3:30 PM Lisette Ruano, PT [facility]   7/20/2018 1:00 PM [facility] [address]   7/20/2018 2:00 PM MD Jennifer Duffyhardy

## 2018-04-25 ENCOUNTER — HOSPITAL ENCOUNTER (OUTPATIENT)
Dept: PHYSICAL THERAPY | Age: 73
Discharge: HOME | End: 2018-04-25
Payer: MEDICARE

## 2018-04-25 PROCEDURE — 97110 THERAPEUTIC EXERCISES: CPT

## 2018-04-25 NOTE — PROGRESS NOTES
PT DAILY TREATMENT NOTE - [facility]     Patient Name: Lizy Garcia  Date:2018  : 1945  [x]  Patient  Verified  Payor: VA MEDICARE / Plan: VA MEDICARE PART A & B / Product Type: Medicare /    In time:  3:26 Out time: 4:25  Total Treatment Time (min): 47  Total Timed Codes (min): 47  1:1 Treatment Time ( only): 10  Visit #: 13 of 20    Treatment Area: Pain in left leg [M79.605]  Low back pain [M54.5]    SUBJECTIVE  Pain Level (0-10 scale): 2-3  Any medication changes, allergies to medications, adverse drug reactions, diagnosis change, or new procedure performed?: [x] No    [] Yes (see summary sheet for update)  Subjective functional status/changes:   [] No changes reported  Wnen I  Moved  Around  And do my exercise  It helps.     OBJECTIVE    Modality rationale: decrease edema, decrease inflammation, decrease pain and increase tissue extensibility to improve the patients ability to perform ADL    Min Type Additional Details    [] Estim:  []Unatt       []IFC  []Premod                        []Other:  []w/ice   []w/heat  Position:  Location:    [] Estim: []Att    []TENS instruct  []NMES                    []Other:  []w/US   []w/ice   []w/heat  Position:  Location:    []  Traction: [] Cervical       []Lumbar                       [] Prone          []Supine                       []Intermittent   []Continuous Lbs:  [] before manual  [] after manual    []  Ultrasound: []Continuous   [] Pulsed                           []1MHz   []3MHz W/cm2:  Location:    []  Iontophoresis with dexamethasone         Location: [] Take home patch   [] In clinic    []  Ice     []  heat  []  Ice massage  []  Laser   []  Anodyne Position:  Location:    []  Laser with stim  []  Other:  Position:  Location:    []  Vasopneumatic Device Pressure:       [] lo [] med [] hi   Temperature: [] lo [] med [] hi   [x] Skin assessment post-treatment:  [x]intact []redness- no adverse reaction    []redness - adverse reaction:      min []Eval                  []Re-Eval       47  1:1  10 min Therapeutic Exercise:  [x] See flow sheet :   Rationale: increase ROM and increase strength to improve the patients ability to perform ADL      min Therapeutic Activity:  []  See flow sheet :   Rationale:   to improve the patients ability to       min Neuromuscular Re-education:  []  See flow sheet :   Rationale:   to improve the patients ability to      min Manual Therapy:     Rationale: decrease pain, increase ROM, increase tissue extensibility and decrease edema  to perform ADL      min Gait Training:  ___ feet with ___ device on level surfaces with ___ level of assist   Rationale: With   [x] TE   [] TA   [] neuro   [] other: Patient Education: [x] Review HEP    [] Progressed/Changed HEP based on:   [] positioning   [] body mechanics   [] transfers   [] heat/ice application    [] other:      Other Objective/Functional Measures:  Fair  Response  To each there ex. Pain Level (0-10 scale) post treatment: 0    ASSESSMENT/Changes in Function: Benefited  With treatment. Patient will continue to benefit from skilled PT services to address functional mobility deficits, address ROM deficits, address strength deficits, analyze and address soft tissue restrictions and analyze and cue movement patterns to attain remaining goals.      [x]  See Plan of Care  []  See progress note/recertification  []  See Discharge Summary         Progress towards goals / Updated goals:  Short Term Goals: To be accomplished in 5 treatments:                        1 patient will have established and be I with HEP to aid with progression of skilled PT program                        EVAL issued                        TBEGVDH  Met  3/7/18  4/2/18                        2 patient will have lumbar FOTO 62 to show increase tolerance to ADLs and household chores                        EVAL 60                        CURRENT 60  4 /16/18  Long Term Goals: To be accomplished in 10-20 treatments:            1 patient will have lumbar FOTO 65 to show increase tolerance to ADLs and household chores                        EVAL 60                        CURRENT          60  4/16/18                             2 patient will ambulate 1/4-1/2 mile with no increase S/S left LE for carryover to community ambulation                        EVAL pain with activities                        CURRENT   .21  4/18/18     7 min   4/25/18                        3 patient will report overall 50% improvement to aid with increase tolerance to ADLs and activities                        EVAL                         CURRENT  50%  Improvement      PLAN  []  Upgrade activities as tolerated     [x]  Continue plan of care  []  Update interventions per flow sheet       []  Discharge due to:_  []  Other:_      Karen Goncalves, PTA 4/25/2018  3:27 PM    Future Appointments  Date Time Provider Trisha Geller   4/25/2018 3:30 PM Karen Miranda, PTA [facility]   4/30/2018 2:30 PM Karen Goncalves, PTA [facility]   5/2/2018 4:00 PM Karen Goncalves, PTA [facility]   5/8/2018 3:30 PM Nata File, PT [facility]   5/10/2018 3:30 PM Nata File, PT [facility]   5/15/2018 3:30 PM Beuford Folds, PT [facility]   5/17/2018 3:00 PM Nata File, PT [facility]   5/22/2018 3:30 PM Beuford Folds, PT [facility]   7/20/2018 1:00 PM [facility] KIRILL [address]   7/20/2018 2:00 PM Adeilna Jones MD [address]

## 2018-04-30 ENCOUNTER — APPOINTMENT (OUTPATIENT)
Dept: PHYSICAL THERAPY | Age: 73
End: 2018-04-30
Payer: MEDICARE

## 2018-05-02 ENCOUNTER — HOSPITAL ENCOUNTER (OUTPATIENT)
Dept: PHYSICAL THERAPY | Age: 73
Discharge: HOME | End: 2018-05-02
Payer: MEDICARE

## 2018-05-02 PROCEDURE — 97110 THERAPEUTIC EXERCISES: CPT

## 2018-05-02 NOTE — PROGRESS NOTES
PT DAILY TREATMENT NOTE - [facility]     Patient Name: Justin Workman  Date:2018  : 1945  [x]  Patient  Verified  Payor: VA MEDICARE / Plan: VA MEDICARE PART A & B / Product Type: Medicare /    In time:  3:45 Out time:4:45  Total Treatment Time (min): 52  Total Timed Codes (min): 52  1:1 Treatment Time ( only): 23  Visit #: 14 of 20    Treatment Area: Pain in left leg [M79.605]  Low back pain [M54.5]    SUBJECTIVE  Pain Level (0-10 scale): 0  Any medication changes, allergies to medications, adverse drug reactions, diagnosis change, or new procedure performed?: [x] No    [] Yes (see summary sheet for update)  Subjective functional status/changes:   [] No changes reported  Stiffness.     OBJECTIVE    Modality rationale: decrease edema, decrease inflammation, decrease pain and increase tissue extensibility to improve the patients ability to perform ADL    Min Type Additional Details    [] Estim:  []Unatt       []IFC  []Premod                        []Other:  []w/ice   []w/heat  Position:  Location:    [] Estim: []Att    []TENS instruct  []NMES                    []Other:  []w/US   []w/ice   []w/heat  Position:  Location:    []  Traction: [] Cervical       []Lumbar                       [] Prone          []Supine                       []Intermittent   []Continuous Lbs:  [] before manual  [] after manual    []  Ultrasound: []Continuous   [] Pulsed                           []1MHz   []3MHz W/cm2:  Location:    []  Iontophoresis with dexamethasone         Location: [] Take home patch   [] In clinic    []  Ice     []  heat  []  Ice massage  []  Laser   []  Anodyne Position:  Location:    []  Laser with stim  []  Other:  Position:  Location:    []  Vasopneumatic Device Pressure:       [] lo [] med [] hi   Temperature: [] lo [] med [] hi   [x] Skin assessment post-treatment:  [x]intact []redness- no adverse reaction    []redness - adverse reaction:      min []Eval                  []Re-Eval       52  1:1  23 min Therapeutic Exercise:  [x] See flow sheet :   Rationale: increase ROM and increase strength to improve the patients ability to perform ADL      min Therapeutic Activity:  []  See flow sheet :   Rationale:   to improve the patients ability to       min Neuromuscular Re-education:  []  See flow sheet :   Rationale:   to improve the patients ability to      min Manual Therapy:     Rationale: decrease pain, increase ROM, increase tissue extensibility and decrease edema  to perform ADL      min Gait Training:  ___ feet with ___ device on level surfaces with ___ level of assist   Rationale: With   [x] TE   [] TA   [] neuro   [] other: Patient Education: [x] Review HEP    [] Progressed/Changed HEP based on:   [] positioning   [] body mechanics   [] transfers   [] heat/ice application    [] other:      Other Objective/Functional Measures:  Completed   Each there ex  Fairly  well    Pain Level (0-10 scale) post treatment: 0    ASSESSMENT/Changes in Function: Benefited  With treatment. Patient will continue to benefit from skilled PT services to address functional mobility deficits, address ROM deficits, address strength deficits, analyze and address soft tissue restrictions, analyze and cue movement patterns and instruct in home and community integration to attain remaining goals.      [x]  See Plan of Care  []  See progress note/recertification  []  See Discharge Summary         Progress towards goals / Updated goals:  Short Term Goals: To be accomplished in 5 treatments:                        6 patient will have established and be I with HEP to aid with progression of skilled PT program                        JAMMIE issued                        LMMZRCJ  Met  3/7/18  4/2/18                        2 patient will have lumbar FOTO 62 to show increase tolerance to ADLs and household chores                        EVAL 60                        CURRENT 60  4 /16/18  Long Term Goals: To be accomplished in 10-20 treatments:            1 patient will have lumbar FOTO 65 to show increase tolerance to ADLs and household chores                        EVAL 60                        CURRENT          60  4/16/18                             2 patient will ambulate 1/4-1/2 mile with no increase S/S left LE for carryover to community ambulation                        EVAL pain with activities                        CURRENT   .21  4/18/18     7 min   4/25/18                        3 patient will report overall 50% improvement to aid with increase tolerance to ADLs and activities                        EVAL                         CURRENT  50%  Improvement      PLAN  []  Upgrade activities as tolerated     []  Continue plan of care  []  Update interventions per flow sheet       []  Discharge due to:_  [x]  Other:_ REASSESS FOTO NV     [address] [facility] 5/2/2018  4:30 PM    Future Appointments  Date Time Provider Trisha Geller   5/8/2018 3:30 PM Juani Hall, PT [facility] [address]   5/10/2018 3:30 PM Juani Hall, PT [facility] [address]   5/15/2018 3:30 PM Asya Perdomo, PT [facility] [address]   5/17/2018 3:00 PM Juani Hall PT [facility] [address]   5/22/2018 3:30 PM Asya Perdomo, PT [facility] [address]   7/20/2018 1:00 PM [facility] [address]   7/20/2018 2:00 PM Shikha Osborn MD [address] [facility]

## 2018-05-08 ENCOUNTER — HOSPITAL ENCOUNTER (OUTPATIENT)
Dept: PHYSICAL THERAPY | Age: 73
Discharge: HOME | End: 2018-05-08
Payer: MEDICARE

## 2018-05-08 PROCEDURE — 97110 THERAPEUTIC EXERCISES: CPT

## 2018-05-08 NOTE — PROGRESS NOTES
[facility] [address], [address] [place] [address], [address] [place] [address],[address]  [phone] fax      Continued Plan of Care/ Re-certification for Physical Therapy Services    Patient name: Jose Manuel Armendariz Start of Care: 3/5/18   Referral source: Nena Wells MD : 1945   Medical/Treatment Diagnosis: Pain in left leg [M79.605]  Low back pain [M54.5] Onset Date:2-3 months     Prior Hospitalization: see medical history Provider#: [id number]   Medications: Verified on Patient Summary List    Comorbidities: cancer - prostate 2016 proton therapy 40 some treatments, HTN, visual impairment , pneumonia, DM    Prior Level of Function: I all areas of ADLs and activities, no AD, household chores and yard work , retired,      Visits from [employer] [facility]: 15   Missed Visits: 1    The Plan of Care and following information is based on the patient's current status:  Goal:patient will have established and be I with HEP to aid with progression of skilled PT program                        Status at last note/certification:eval  Current Status: met    Glo Persaud will have lumbar FOTO 62 to show increase tolerance to ADLs and household chores                        Status at last note/certification:eval  Current Status: not met, 60    Goal:patient will ambulate 1/4-1/2 mile with no increase S/S left LE for carryover to community ambulation                        Status at last note/certification:eval  Current Status: not met, .19 miles     Goal:patient will report overall 50% improvement to aid with increase tolerance to ADLs and activities                 Status at last note/certification:eval  Current Status: met    Key functional changes: decrease pain, increase tolerance to exercises      Problems/ barriers to goal attainment: residual pain     Problem List: pain affecting function, decrease ROM, decrease strength, impaired gait/ balance, decrease ADL/ functional abilitiies, decrease activity tolerance, decrease flexibility/ joint mobility and decrease transfer abilities    Treatment Plan: Therapeutic exercise, Therapeutic activities, Neuromuscular re-education, Physical agent/modality, Manual therapy, Patient education, Self Care training and Home safety training     Patient Goal (s) has been updated and includes: further decrease pain and get stronger     Goals for this certification period to be accomplished in 5-10 treatments:                       1 patient will have lumbar FOTO 65 to show increase tolerance to ADLs and household chores  [phone]                             7 patient will ambulate 1/4-1/2 mile with no increase S/S left LE for carryover to community ambulation                        PN   . 19 miles                        CURRENT                           4 patient will report overall 75% improvement to aid with increase tolerance to ADLs and activities                        PN 50                        CURRENT                4 patient will report pain 1-2/10 to aid with increase tolerance to ADLS and activities                           PN 3             CURRENT    Frequency / Duration: Patient to be seen 2-3times per week for 5-10 treatments:    Assessment / Recommendations:Patient demonstrates the potential to make further gains with improving ROM, strength, endurance/activity tolerance, functional FOTO survey score  and all within a reasonable time frame so as to further increase their functional independence with ADLs and activities for carryover to  Improved quality of life and tolerance to household chores and community ambulation. Patient requires skilled Physical Therapy so as to monitor their response to and modify their treatment plan accordingly. Patient appears to be an appropriate candidate for skilled outpatient Physical Therapy.       G-Codes (GP)  Mobility  [id number] Current  CK= 40-59%  [id number] Goal  CJ= 20-39%     The severity rating is based on clinical judgment and the FOTO score. Certification Period: 5/5/18 - 6/4/18    Bina Marizol, PT 5/8/2018 4:15 PM    ________________________________________________________________________  I certify that the above Therapy Services are being furnished while the patient is under my care. I agree with the treatment plan and certify that this therapy is necessary. [] I have read the above and request that my patient continue as recommended.   [] I have read the above report and request that my patient continue therapy with the following changes/special instructions: _____________________________________________  [] I have read the above report and request that my patient be discharged from therapy    Physician's Signature:____________________________________Date:___________Time:__________    Please sign and return to [facility] [address] [place] [address], [address] [place] [address],[facility] [address]  [phone] fax

## 2018-05-08 NOTE — PROGRESS NOTES
PT DAILY TREATMENT NOTE - [facility]     Patient Name: Art Pop  Date:2018  : 1945  [x]  Patient  Verified  Payor: [place] MEDICARE / Plan: [place] MEDICARE PART A & B / Product Type: Medicare /    In time:333  Out time:432  Total Treatment Time (min): 59  Total Timed Codes (min): 59  1:1 Treatment Time [facility] only):38   Visit #: 15 of 20    Treatment Area: Pain in left leg [M79.605]  Low back pain [M54.5]    SUBJECTIVE  Pain Level (0-10 scale): 3  Any medication changes, allergies to medications, adverse drug reactions, diagnosis change, or new procedure performed?: [x] No    [] Yes (see summary sheet for update)  Subjective functional status/changes:   [] No changes reported  Doing alright.      OBJECTIVE    Modality rationale:     Min Type Additional Details    [] Estim:  []Unatt       []IFC  []Premod                        []Other:  []w/ice   []w/heat  Position:  Location:    [] Estim: []Att    []TENS instruct  []NMES                    []Other:  []w/US   []w/ice   []w/heat  Position:  Location:    []  Traction: [] Cervical       []Lumbar                       [] Prone          []Supine                       []Intermittent   []Continuous Lbs:  [] before manual  [] after manual    []  Ultrasound: []Continuous   [] Pulsed                           []1MHz   []3MHz W/cm2:  Location:    []  Iontophoresis with dexamethasone         Location: [] Take home patch   [] In clinic    []  Ice     []  heat  []  Ice massage  []  Laser   []  Anodyne Position:  Location:    []  Laser with stim  []  Other:  Position:  Location:    []  Vasopneumatic Device Pressure:       [] lo [] med [] hi   Temperature: [] lo [] med [] hi   [] Skin assessment post-treatment:  []intact []redness- no adverse reaction    []redness - adverse reaction:      min []Eval                  []Re-Eval       59 min Therapeutic Exercise:  [x] See flow sheet :   Rationale: increase ROM, increase strength, improve coordination, improve balance and increase proprioception to improve the patients ability to aid with increase tolerance to ADLs and activities     min Therapeutic Activity:  []  See flow sheet :   Rationale:   to improve the patients ability to       min Neuromuscular Re-education:  []  See flow sheet :   Rationale:   to improve the patients ability to      min Manual Therapy:     Rationale:  to      min Gait Training:  ___ feet with ___ device on level surfaces with ___ level of assist   Rationale: With   [] TE   [] TA   [] neuro   [] other: Patient Education: [x] Review HEP    [] Progressed/Changed HEP based on:   [] positioning   [] body mechanics   [] transfers   [] heat/ice application    [] other:      Other Objective/Functional Measures: VC exercises and tech. Pain Level (0-10 scale) post treatment: 0    ASSESSMENT/Changes in Function: tolerated well. Patient will continue to benefit from skilled PT services to modify and progress therapeutic interventions, address functional mobility deficits, address ROM deficits, address strength deficits, analyze and address soft tissue restrictions, analyze and cue movement patterns, analyze and modify body mechanics/ergonomics, assess and modify postural abnormalities and instruct in home and community integration to attain remaining goals. [x]  See Plan of Care  [x]  See progress note/recertification  []  See Discharge Summary         Progress towards goals / Updated goals:  Goals updated on recert note    PLAN  [x]  Upgrade activities as tolerated     [x]  Continue plan of care  []  Update interventions per flow sheet       []  Discharge due to:_  [x]  Other:_ send recert note, and progress exercise to include functional lifting and carrying and sled push pull.       Jing Pedroza, PT 5/8/2018  3:55 PM    Future Appointments  Date Time Provider Trisha Geller   5/10/2018 3:30 PM Jing Pedroza, PT [facility]   5/15/2018 3:30 PM Noni Bacon, PT [facility]   5/17/2018 3:00 PM Mimi Morrow, PT [facility]   5/22/2018 3:30 PM Rj Patel, PT [facility]   7/20/2018 1:00 PM [facility]   7/20/2018 2:00 PM Samreen Pascual MD [address]

## 2018-05-10 ENCOUNTER — HOSPITAL ENCOUNTER (OUTPATIENT)
Dept: PHYSICAL THERAPY | Age: 73
Discharge: HOME | End: 2018-05-10
Payer: MEDICARE

## 2018-05-10 PROCEDURE — 97110 THERAPEUTIC EXERCISES: CPT

## 2018-05-10 PROCEDURE — 97530 THERAPEUTIC ACTIVITIES: CPT

## 2018-05-10 NOTE — PROGRESS NOTES
PT DAILY TREATMENT NOTE - [facility]     Patient Name: Regino Shirley  Date:5/10/2018  : 1945  [x]  Patient  Verified  Payor: Fabienne Funkory / Plan: [place] MEDICARE PART A & B / Product Type: Medicare /    In time:329  Out time:432  Total Treatment Time (min):56  Total Timed Codes (min): 56  1:1 Treatment Time ([address] only): 45   Visit #: 1 of 5-10     Treatment Area: Pain in left leg [M79.605]  Low back pain [M54.5]    SUBJECTIVE  Pain Level (0-10 scale): 2  Any medication changes, allergies to medications, adverse drug reactions, diagnosis change, or new procedure performed?: [x] No    [] Yes (see summary sheet for update)  Subjective functional status/changes:   [] No changes reported  Doing alright.     OBJECTIVE    Modality rationale:     Min Type Additional Details    [] Estim:  []Unatt       []IFC  []Premod                        []Other:  []w/ice   []w/heat  Position:  Location:    [] Estim: []Att    []TENS instruct  []NMES                    []Other:  []w/US   []w/ice   []w/heat  Position:  Location:    []  Traction: [] Cervical       []Lumbar                       [] Prone          []Supine                       []Intermittent   []Continuous Lbs:  [] before manual  [] after manual    []  Ultrasound: []Continuous   [] Pulsed                           []1MHz   []3MHz W/cm2:  Location:    []  Iontophoresis with dexamethasone         Location: [] Take home patch   [] In clinic    []  Ice     []  heat  []  Ice massage  []  Laser   []  Anodyne Position:  Location:    []  Laser with stim  []  Other:  Position:  Location:    []  Vasopneumatic Device Pressure:       [] lo [] med [] hi   Temperature: [] lo [] med [] hi   [] Skin assessment post-treatment:  []intact []redness- no adverse reaction    []redness - adverse reaction:       min []Eval                  []Re-Eval       48 min Therapeutic Exercise:  [x] See flow sheet :   Rationale: increase ROM, increase strength, improve coordination, improve balance and increase proprioception to improve the patients ability to aid with increase tolerance to ADLs and activities    8 min Therapeutic Activity:  [x]  See flow sheet :functional lifting, carrying and push pull. Rationale: increase strength  to improve the patients ability to tolerate activities at home and in the yard    min Neuromuscular Re-education:  []  See flow sheet :   Rationale:   to improve the patients ability to      min Manual Therapy:     Rationale:  to      min Gait Training:  ___ feet with ___ device on level surfaces with ___ level of assist   Rationale: With   [] TE   [] TA   [] neuro   [] other: Patient Education: [x] Review HEP    [] Progressed/Changed HEP based on:   [] positioning   [] body mechanics   [] transfers   [] heat/ice application    [] other:      Other Objective/Functional Measures: VC exercises and tech. Pain Level (0-10 scale) post treatment: 0    ASSESSMENT/Changes in Function: tolerated well. Patient will continue to benefit from skilled PT services to modify and progress therapeutic interventions, address functional mobility deficits, address ROM deficits, address strength deficits, analyze and address soft tissue restrictions, analyze and cue movement patterns, analyze and modify body mechanics/ergonomics, assess and modify postural abnormalities and instruct in home and community integration to attain remaining goals.      [x]  See Plan of Care  [x]  See progress note/recertification  []  See Discharge Summary         Progress towards goals / Updated goals:  Goals for this certification period to be accomplished in 5-10 treatments:                       1 patient will have lumbar FOTO 65 to show increase tolerance to ADLs and household chores                         PNL 60                        CURRENT    lumbar 64 5/10/18                               5 patient will ambulate 1/4-1/2 mile with no increase S/S left LE for carryover to community ambulation                        PN   . 19 miles                        CURRENT   .17  5/10/18                        8 patient will report overall 75% improvement to aid with increase tolerance to ADLs and activities                        PN 50                        CURRENT                                      4 patient will report pain 1-2/10 to aid with increase tolerance to ADLS and activities                           PN 3                                   CURRENT 2 at arrival 5/10/18       PLAN  [x]  Upgrade activities as tolerated     [x]  Continue plan of care  []  Update interventions per flow sheet       []  Discharge due to:_  []  Other:_      Sonoita Precise, PT 5/10/2018  3:34 PM    Future Appointments  Date Time Provider Trisha Geller   5/15/2018 3:30 PM Ulysess Neat, PT [facility]   5/17/2018 3:00 PM Sonoita Precise, PT [facility]   5/22/2018 3:30 PM Ulysess Neat, PT [facility]   7/20/2018 1:00 PM [facility]   7/20/2018 2:00 PM Danuta Bartlett MD [address] [facility]

## 2018-05-15 ENCOUNTER — HOSPITAL ENCOUNTER (OUTPATIENT)
Dept: PHYSICAL THERAPY | Age: 73
Discharge: HOME | End: 2018-05-15
Payer: MEDICARE

## 2018-05-15 PROCEDURE — 97110 THERAPEUTIC EXERCISES: CPT

## 2018-05-15 NOTE — PROGRESS NOTES
PT DAILY TREATMENT NOTE - [facility]    Patient Name: Conchita Hi  Date:5/15/2018  : 1945  [x]  Patient  Verified  Payor: Aydin Abdalla / Plan: [place] MEDICARE PART A & B / Product Type: Medicare /    In time:3:27  Out time:4:31  Total Treatment Time (min): 64  Total Timed Codes (min): 64  1:1 Treatment Time ([address] only): 33   Visit #: 2 of 5-10    Treatment Area: Pain in left leg [M79.605]  Low back pain [M54.5]    SUBJECTIVE  Pain Level (0-10 scale): 0  Any medication changes, allergies to medications, adverse drug reactions, diagnosis change, or new procedure performed?: [x] No    [] Yes (see summary sheet for update)  Subjective functional status/changes:   [] No changes reported  Doing pretty good today, no pain    OBJECTIVE  52 min Therapeutic Exercise:  [x] See flow sheet :   Rationale: increase ROM, increase strength and improve coordination to improve the patients ability to tolerate increased activity levels  12 min Therapeutic Activity:  [x]  See flow sheet : TM Ambulation   Rationale: increase ROM, increase strength and improve coordination  to improve the patients ability to perform increased ADL      With   [x] TE   [x] TA   [] neuro   [] other: Patient Education: [x] Review HEP    [] Progressed/Changed HEP based on:   [] positioning   [] body mechanics   [] transfers   [] heat/ice application    [] other:      Other Objective/Functional Measures:   - Pt amb on TM for 0.39 miles in 12' w/o added pain  - Good exercise participation  - Increased weight and reps per flow sheet     Pain Level (0-10 scale) post treatment: 0    ASSESSMENT/Changes in Function:      Patient will continue to benefit from skilled PT services to modify and progress therapeutic interventions, address functional mobility deficits, address ROM deficits, address strength deficits, analyze and address soft tissue restrictions and analyze and cue movement patterns to attain remaining goals.      []  See Plan of Care  []  See progress note/recertification  []  See Discharge Summary         Progress towards goals / Updated goals:  Goals for this certification period to be accomplished in 5-10 treatments:                       1 patient will have lumbar FOTO 65 to show increase tolerance to ADLs and household chores                         PNL 60                        CURRENT    lumbar 64 5/10/18                               5 patient will ambulate 1/4-1/2 mile with no increase S/S left LE for carryover to community ambulation                        PN   .19 miles                        CURRENT   Met at 0.39 milse in 12 min 5/15/18                        3 patient will report overall 75% improvement to aid with increase tolerance to ADLs and activities                        PN 50                        CURRENT                                      4 patient will report pain 1-2/10 to aid with increase tolerance to ADLS and activities                           PN 3                                   CURRENT Met at pain 0/10 5/15/18   PLAN  [x]  Upgrade activities as tolerated     [x]  Continue plan of care  []  Update interventions per flow sheet       []  Discharge due to:_  []  Other:_      Rahel Tenorio, PT 5/15/2018  7:19 PM    Future Appointments  Date Time Provider Trisha Geller   5/17/2018 3:00 PM Nayla Rashid, PT [facility]   5/22/2018 3:30 PM Rahel Tenorio, PT [facility]   7/20/2018 1:00 PM [facility] [address]   7/20/2018 2:00 PM Kareen Barragan MD [address] [facility]

## 2018-05-17 ENCOUNTER — HOSPITAL ENCOUNTER (OUTPATIENT)
Dept: PHYSICAL THERAPY | Age: 73
Discharge: HOME | End: 2018-05-17
Payer: MEDICARE

## 2018-05-17 PROCEDURE — 97110 THERAPEUTIC EXERCISES: CPT

## 2018-05-17 PROCEDURE — 97530 THERAPEUTIC ACTIVITIES: CPT

## 2018-05-17 NOTE — PROGRESS NOTES
PT DAILY TREATMENT NOTE - [facility]     Patient Name: Gonzalez Patrick  Date:2018  : 1945  [x]  Patient  Verified  Payor: Isabela Jacobs / Plan: [place] MEDICARE PART A & B / Product Type: Medicare /    In time:310  Out time:400  Total Treatment Time (min): 46  Total Timed Codes (min): 46  1:1 Treatment Time [facility] only):46  Visit #: 3 of 5-10    Treatment Area: Pain in left leg [M79.605]  Low back pain [M54.5]    SUBJECTIVE  Pain Level (0-10 scale): 0  Any medication changes, allergies to medications, adverse drug reactions, diagnosis change, or new procedure performed?: [x] No    [] Yes (see summary sheet for update)  Subjective functional status/changes:   [] No changes reported  I was doing a lot of yard work this morning. Cut the grass, edged it. ...      OBJECTIVE    Modality rationale:     Min Type Additional Details    [] Estim:  []Unatt       []IFC  []Premod                        []Other:  []w/ice   []w/heat  Position:  Location:    [] Estim: []Att    []TENS instruct  []NMES                    []Other:  []w/US   []w/ice   []w/heat  Position:  Location:    []  Traction: [] Cervical       []Lumbar                       [] Prone          []Supine                       []Intermittent   []Continuous Lbs:  [] before manual  [] after manual    []  Ultrasound: []Continuous   [] Pulsed                           []1MHz   []3MHz W/cm2:  Location:    []  Iontophoresis with dexamethasone         Location: [] Take home patch   [] In clinic    []  Ice     []  heat  []  Ice massage  []  Laser   []  Anodyne Position:  Location:    []  Laser with stim  []  Other:  Position:  Location:    []  Vasopneumatic Device Pressure:       [] lo [] med [] hi   Temperature: [] lo [] med [] hi   [] Skin assessment post-treatment:  []intact []redness- no adverse reaction    []redness - adverse reaction:      min []Eval                  []Re-Eval       34 min Therapeutic Exercise:  [x] See flow sheet :   Rationale: increase ROM, increase strength, improve coordination, improve balance and increase proprioception to improve the patients ability to aid with increase tolerance to ADLs and activities    12 min Therapeutic Activity:  []  See flow sheet :TM for carryover to community ambulation   Rationale: increase strength, improve coordination, improve balance and increase proprioception  to improve the patients ability to increase tolerance to ADLs, and community activities      min Neuromuscular Re-education:  []  See flow sheet :   Rationale:   to improve the patients ability to      min Manual Therapy:     Rationale:  to      min Gait Training:  ___ feet with ___ device on level surfaces with ___ level of assist   Rationale: With   [] TE   [] TA   [] neuro   [] other: Patient Education: [x] Review HEP    [] Progressed/Changed HEP based on:   [] positioning   [] body mechanics   [] transfers   [] heat/ice application    [] other:      Other Objective/Functional Measures: VC exercises and tech    Pain Level (0-10 scale) post treatment: 0    ASSESSMENT/Changes in Function: tolerated well    Patient will continue to benefit from skilled PT services to modify and progress therapeutic interventions, address functional mobility deficits, address ROM deficits, address strength deficits, analyze and address soft tissue restrictions, analyze and cue movement patterns, analyze and modify body mechanics/ergonomics, assess and modify postural abnormalities and instruct in home and community integration to attain remaining goals.      [x]  See Plan of Care  [x]  See progress note/recertification  []  See Discharge Summary         Goals for this certification period to be accomplished in 5-10 treatments:                        1 patient will have lumbar FOTO 65 to show increase tolerance to ADLs and household chores                         PNL 60                        CURRENT    lumbar 56 5/10/18                               2 patient will ambulate 1/4-1/2 mile with no increase S/S left LE for carryover to community ambulation                        PN   .19 miles                        CURRENT   Met at 0.39 milse in 12 min 5/15/18                        3 patient will report overall 75% improvement to aid with increase tolerance to ADLs and activities                        AE 14                        DBTTRBR                                      4 patient will report pain 1-2/10 to aid with increase tolerance to ADLS and activities                           PN 3                                   CURRENT Met at pain 0/10 5/15/18   0 5/17/18    PLAN  [x]  Upgrade activities as tolerated     [x]  Continue plan of care  []  Update interventions per flow sheet       []  Discharge due to:_  []  Other:_      Tyrell Motta, PT 5/17/2018  3:16 PM    Future Appointments  Date Time Provider Trisha Geller   5/22/2018 3:30 PM [facility]   7/20/2018 1:00 PM [facility]   7/20/2018 2:00 PM Alize Tamayo MD [address] [facility]

## 2018-05-22 ENCOUNTER — HOSPITAL ENCOUNTER (OUTPATIENT)
Dept: PHYSICAL THERAPY | Age: 73
Discharge: HOME | End: 2018-05-22
Payer: MEDICARE

## 2018-05-22 PROCEDURE — 97110 THERAPEUTIC EXERCISES: CPT

## 2018-05-22 NOTE — PROGRESS NOTES
PT DAILY TREATMENT NOTE - [facility]    Patient Name: Mimi Hernandez  Date:2018  : 1945  [x]  Patient  Verified  Payor: Renny Frankel / Plan: [place] / Product Type: Medicare /    In time:3:36  Out time:4:39  Total Treatment Time (min): 63  Total Timed Codes (min): 63  1:1 Treatment Time ( [address] only): 13  Visit #: 4 of 5-10    Treatment Area: Pain in left leg [M79.605]  Low back pain [M54.5]    SUBJECTIVE  Pain Level (0-10 scale): 0  Any medication changes, allergies to medications, adverse drug reactions, diagnosis change, or new procedure performed?: [x] No    [] Yes (see summary sheet for update)  Subjective functional status/changes:   [] No changes reported  Doing pretty good today, no pain    OBJECTIVE  50 min Therapeutic Exercise:  [x] See flow sheet :   Rationale: increase ROM, increase strength and improve coordination to improve the patients ability to tolerate increased activity levels  10 min Therapeutic Activity:  [x]  See flow sheet : TM Ambulation   Rationale: increase ROM, increase strength and improve coordination  to improve the patients ability to perform increased ADL      With   [x] TE   [x] TA   [] neuro   [] other: Patient Education: [x] Review HEP    [] Progressed/Changed HEP based on:   [] positioning   [] body mechanics   [] transfers   [] heat/ice application    [] other:      Other Objective/Functional Measures:   - Pt amb on TM for 0.44 miles in 13' w/o added pain  - Good exercise participation    Pain Level (0-10 scale) post treatment: 0    ASSESSMENT/Changes in Function:      Patient will continue to benefit from skilled PT services to modify and progress therapeutic interventions, address functional mobility deficits, address ROM deficits, address strength deficits, analyze and address soft tissue restrictions and analyze and cue movement patterns to attain remaining goals.      []  See Plan of Care  []  See progress note/recertification  []  See Discharge Summary         Progress towards goals / Updated goals:  Goals for this certification period to be accomplished in 5-10 treatments:                       1 patient will have lumbar FOTO 65 to show increase tolerance to ADLs and household chores                         PNL 60                        CURRENT    lumbar 64 5/10/18                               4 patient will ambulate 1/4-1/2 mile with no increase S/S left LE for carryover to community ambulation                        PN   .19 miles                        CURRENT   Met at 0.44 miles in 13 min 5/22/18                        3 patient will report overall 75% improvement to aid with increase tolerance to ADLs and activities                        PN 50                        CURRENT                                      4 patient will report pain 1-2/10 to aid with increase tolerance to ADLS and activities                           PN 3                                   CURRENT Met at pain 0/10 5/15/18   PLAN  [x]  Upgrade activities as tolerated     [x]  Continue plan of care  []  Update interventions per flow sheet       []  Discharge due to:_  []  Other:_      Thu Funez, PT 5/22/2018  7:49 PM    Future Appointments  Date Time Provider Trisha Geller   7/20/2018 1:00 PM [facility] KIRILL Acharya [facility] HELEN CDE   7/20/2018 2:00 PM Angi Champagne MD [id number]

## 2018-08-17 NOTE — PROGRESS NOTES
[facility]  [address] [facility], [address] [place] [address], [address] [place], [place] [address]  [phone] fax      Discharge Summary    Patient name: Sabrina Marquez     Start of Care: 3/5/18  Referral source: Tamanna Srinivasan MD    : 1945  Medical/Treatment Diagnosis: Pain in left leg [M79.605]  Low back pain [M54.5]  Onset Date:2-3 MONTHS  Prior Hospitalization: see medical history   Provider#: [id number]  Comorbidities: cancer - prostate 2016 proton therapy 40 some treatments, HTN, visual impairment , pneumonia, DM    Prior Level of Function: I all areas of ADLs and activities, no AD, household chores and yard work , retired,     Medications: Verified on Patient Summary List    Visits from [facility]: 19    Missed Visits: 1  Reporting Period : 3/5/18 to 18      Summary of Care:RESIDUAL PAIN 0. FOTO IMPROVED TO LEG 66, LUMBAR DECLINED TO 56. HE HAD EXERCISES FOR HIS HEP AND SHOULD FOLLOW UP WITH HIS MD AS NEEDED. THANK YOU.    Souleymane Bamberger will have established and be I with HEP to aid with progression of skilled PT program   Status at last note/certification:EVAL  Status at discharge: met    Goal: patient will have lumbar FOTO 62 to show increase tolerance to ADLs and household chores        Status at last note/certification:EVAL  Status at discharge: met    Goal:patient will ambulate 1/4-1/2 mile with no increase S/S left LE for carryover to community ambulation          Status at last note/certification:EVAL  Status at discharge: met    Goal: patient will report overall 50% improvement to aid with increase tolerance to ADLs and activities        Status at last note/certification:EVAL  Status at discharge: met    G-Codes (GP)  NO G CODES TO REPORT DUE TO UNPLANNED DISCHARGE    ASSESSMENT/RECOMMENDATIONS:  []Discontinue therapy progressing towards or have reached established goals  []Discontinue therapy due to lack of appreciable progress towards goals  [x]Discontinue therapy due to lack of attendance or compliance  [x]Other:NO FURTHER CONTACT    Thank you for this referral.     Jing Pedroza, PT 8/17/2018 9:40 AM

## 2019-07-05 PROBLEM — N20.0 NEPHROLITHIASIS: Status: ACTIVE | Noted: 2018-03-16

## 2019-07-05 PROBLEM — M51.27 LUMBOSACRAL DISC HERNIATION: Status: ACTIVE | Noted: 2019-04-10

## 2019-07-05 PROBLEM — G24.4 ORAL DYSKINESIA: Status: ACTIVE | Noted: 2019-03-08

## 2019-07-05 PROBLEM — Z87.448 HISTORY OF HEMATURIA: Status: ACTIVE | Noted: 2018-03-16

## 2020-10-23 PROBLEM — M19.041 OSTEOARTHRITIS OF FINGER OF RIGHT HAND: Status: ACTIVE | Noted: 2020-08-24

## 2020-10-23 PROBLEM — I82.412 ACUTE DEEP VEIN THROMBOSIS (DVT) OF FEMORAL VEIN OF LEFT LOWER EXTREMITY (HCC): Status: ACTIVE | Noted: 2020-05-22

## 2020-10-23 PROBLEM — M79.642 BILATERAL HAND PAIN: Status: ACTIVE | Noted: 2020-07-27

## 2020-10-23 PROBLEM — M25.641 STIFFNESS OF RIGHT HAND JOINT: Status: ACTIVE | Noted: 2020-08-24

## 2020-10-23 PROBLEM — I82.409 DVT (DEEP VENOUS THROMBOSIS) (HCC): Status: ACTIVE | Noted: 2020-05-22

## 2020-10-23 PROBLEM — M65.321 TRIGGER FINGER, RIGHT INDEX FINGER: Status: ACTIVE | Noted: 2020-07-27

## 2020-10-23 PROBLEM — M79.641 BILATERAL HAND PAIN: Status: ACTIVE | Noted: 2020-07-27

## 2021-01-25 PROBLEM — I74.9 THROMBOEMBOLISM (HCC): Status: ACTIVE | Noted: 2021-01-05

## 2021-11-14 PROBLEM — R55 NEAR SYNCOPE: Status: ACTIVE | Noted: 2021-11-14

## 2022-03-18 PROBLEM — M25.641 STIFFNESS OF RIGHT HAND JOINT: Status: ACTIVE | Noted: 2020-08-24

## 2022-03-18 PROBLEM — I82.412 ACUTE DEEP VEIN THROMBOSIS (DVT) OF FEMORAL VEIN OF LEFT LOWER EXTREMITY (HCC): Status: ACTIVE | Noted: 2020-05-22

## 2022-03-18 PROBLEM — R55 NEAR SYNCOPE: Status: ACTIVE | Noted: 2021-11-14

## 2022-03-19 PROBLEM — M19.041 OSTEOARTHRITIS OF FINGER OF RIGHT HAND: Status: ACTIVE | Noted: 2020-08-24

## 2022-03-19 PROBLEM — M54.42 LEFT-SIDED LOW BACK PAIN WITH LEFT-SIDED SCIATICA: Status: ACTIVE | Noted: 2017-04-21

## 2022-03-19 PROBLEM — I45.2 RBBB (RIGHT BUNDLE BRANCH BLOCK WITH LEFT ANTERIOR FASCICULAR BLOCK): Status: ACTIVE | Noted: 2017-12-07

## 2022-03-19 PROBLEM — N20.0 NEPHROLITHIASIS: Status: ACTIVE | Noted: 2018-03-16

## 2022-03-19 PROBLEM — M79.641 BILATERAL HAND PAIN: Status: ACTIVE | Noted: 2020-07-27

## 2022-03-19 PROBLEM — M65.321 TRIGGER FINGER, RIGHT INDEX FINGER: Status: ACTIVE | Noted: 2020-07-27

## 2022-03-19 PROBLEM — M79.642 BILATERAL HAND PAIN: Status: ACTIVE | Noted: 2020-07-27

## 2022-03-19 PROBLEM — M51.27 LUMBOSACRAL DISC HERNIATION: Status: ACTIVE | Noted: 2019-04-10

## 2022-03-19 PROBLEM — I82.409 DVT (DEEP VENOUS THROMBOSIS) (HCC): Status: ACTIVE | Noted: 2020-05-22

## 2022-03-19 PROBLEM — R97.20 PSA ELEVATION: Status: ACTIVE | Noted: 2017-12-07

## 2022-03-19 PROBLEM — G24.4 ORAL DYSKINESIA: Status: ACTIVE | Noted: 2019-03-08

## 2022-03-20 PROBLEM — Z87.448 HISTORY OF HEMATURIA: Status: ACTIVE | Noted: 2018-03-16

## 2022-03-20 PROBLEM — I74.9 THROMBOEMBOLISM (HCC): Status: ACTIVE | Noted: 2021-01-05

## 2024-03-05 ENCOUNTER — HOSPITAL ENCOUNTER (OUTPATIENT)
Facility: HOSPITAL | Age: 79
Discharge: HOME | End: 2024-03-08
Payer: MEDICARE

## 2024-03-05 ENCOUNTER — TRANSCRIBE ORDERS (OUTPATIENT)
Facility: HOSPITAL | Age: 79
End: 2024-03-05

## 2024-03-05 DIAGNOSIS — I10 HYPERTENSION, UNSPECIFIED TYPE: Primary | ICD-10-CM

## 2024-03-05 DIAGNOSIS — N18.9 CHRONIC KIDNEY DISEASE, UNSPECIFIED CKD STAGE: ICD-10-CM

## 2024-03-05 DIAGNOSIS — I10 HYPERTENSION, UNSPECIFIED TYPE: ICD-10-CM

## 2024-03-05 LAB
25(OH)D3 SERPL-MCNC: 10.6 NG/ML (ref 30–100)
ALBUMIN SERPL-MCNC: 3.9 G/DL (ref 3.4–5)
ANION GAP SERPL CALC-SCNC: 5 MMOL/L (ref 3–18)
BASOPHILS # BLD: 0.1 K/UL (ref 0–0.1)
BASOPHILS NFR BLD: 1 % (ref 0–2)
BUN SERPL-MCNC: 18 MG/DL (ref 7–18)
BUN/CREAT SERPL: 11 (ref 12–20)
CALCIUM SERPL-MCNC: 9.2 MG/DL (ref 8.5–10.1)
CALCIUM SERPL-MCNC: 9.3 MG/DL (ref 8.5–10.1)
CHLORIDE SERPL-SCNC: 109 MMOL/L (ref 100–111)
CO2 SERPL-SCNC: 26 MMOL/L (ref 21–32)
CREAT SERPL-MCNC: 1.61 MG/DL (ref 0.6–1.3)
CREAT UR-MCNC: 115 MG/DL (ref 30–125)
DIFFERENTIAL METHOD BLD: ABNORMAL
EOSINOPHIL # BLD: 0.2 K/UL (ref 0–0.4)
EOSINOPHIL NFR BLD: 5 % (ref 0–5)
ERYTHROCYTE [DISTWIDTH] IN BLOOD BY AUTOMATED COUNT: 13 % (ref 11.6–14.5)
GLUCOSE SERPL-MCNC: 117 MG/DL (ref 74–99)
HCT VFR BLD AUTO: 43.8 % (ref 36–48)
HGB BLD-MCNC: 14.8 G/DL (ref 13–16)
IMM GRANULOCYTES # BLD AUTO: 0 K/UL (ref 0–0.04)
IMM GRANULOCYTES NFR BLD AUTO: 0 % (ref 0–0.5)
LYMPHOCYTES # BLD: 0.9 K/UL (ref 0.9–3.6)
LYMPHOCYTES NFR BLD: 19 % (ref 21–52)
MCH RBC QN AUTO: 29.2 PG (ref 24–34)
MCHC RBC AUTO-ENTMCNC: 33.8 G/DL (ref 31–37)
MCV RBC AUTO: 86.4 FL (ref 78–100)
MONOCYTES # BLD: 0.4 K/UL (ref 0.05–1.2)
MONOCYTES NFR BLD: 9 % (ref 3–10)
NEUTS SEG # BLD: 3.2 K/UL (ref 1.8–8)
NEUTS SEG NFR BLD: 66 % (ref 40–73)
NRBC # BLD: 0 K/UL (ref 0–0.01)
NRBC BLD-RTO: 0 PER 100 WBC
PHOSPHATE SERPL-MCNC: 2.7 MG/DL (ref 2.5–4.9)
PLATELET # BLD AUTO: 161 K/UL (ref 135–420)
PMV BLD AUTO: 9.6 FL (ref 9.2–11.8)
POTASSIUM SERPL-SCNC: 3.2 MMOL/L (ref 3.5–5.5)
PROT UR-MCNC: 23 MG/DL
PTH-INTACT SERPL-MCNC: 202.7 PG/ML (ref 18.4–88)
RBC # BLD AUTO: 5.07 M/UL (ref 4.35–5.65)
SODIUM SERPL-SCNC: 140 MMOL/L (ref 136–145)
URATE SERPL-MCNC: 6.9 MG/DL (ref 2.6–7.2)
WBC # BLD AUTO: 4.8 K/UL (ref 4.6–13.2)

## 2024-03-05 PROCEDURE — 82306 VITAMIN D 25 HYDROXY: CPT

## 2024-03-05 PROCEDURE — 84156 ASSAY OF PROTEIN URINE: CPT

## 2024-03-05 PROCEDURE — 82570 ASSAY OF URINE CREATININE: CPT

## 2024-03-05 PROCEDURE — 80069 RENAL FUNCTION PANEL: CPT

## 2024-03-05 PROCEDURE — 83970 ASSAY OF PARATHORMONE: CPT

## 2024-03-05 PROCEDURE — 84550 ASSAY OF BLOOD/URIC ACID: CPT

## 2024-03-05 PROCEDURE — 36415 COLL VENOUS BLD VENIPUNCTURE: CPT

## 2024-03-05 PROCEDURE — 85025 COMPLETE CBC W/AUTO DIFF WBC: CPT

## 2024-09-04 ENCOUNTER — TRANSCRIBE ORDERS (OUTPATIENT)
Facility: HOSPITAL | Age: 79
End: 2024-09-04

## 2024-09-04 ENCOUNTER — HOSPITAL ENCOUNTER (OUTPATIENT)
Facility: HOSPITAL | Age: 79
Discharge: HOME | End: 2024-09-07
Payer: MEDICARE

## 2024-09-04 DIAGNOSIS — N28.9 KIDNEY DISEASE: Primary | ICD-10-CM

## 2024-09-04 DIAGNOSIS — N28.9 KIDNEY DISEASE: ICD-10-CM

## 2024-09-04 LAB
25(OH)D3 SERPL-MCNC: 15.3 NG/ML (ref 30–100)
ALBUMIN SERPL-MCNC: 3.8 G/DL (ref 3.4–5)
ANION GAP SERPL CALC-SCNC: 7 MMOL/L (ref 3–18)
BASOPHILS # BLD: 0.1 K/UL (ref 0–0.1)
BASOPHILS NFR BLD: 1 % (ref 0–2)
BUN SERPL-MCNC: 26 MG/DL (ref 7–18)
BUN/CREAT SERPL: 15 (ref 12–20)
CALCIUM SERPL-MCNC: 9.5 MG/DL (ref 8.5–10.1)
CALCIUM SERPL-MCNC: 9.5 MG/DL (ref 8.5–10.1)
CHLORIDE SERPL-SCNC: 110 MMOL/L (ref 100–111)
CO2 SERPL-SCNC: 22 MMOL/L (ref 21–32)
CREAT SERPL-MCNC: 1.72 MG/DL (ref 0.6–1.3)
CREAT UR-MCNC: 113 MG/DL (ref 30–125)
DIFFERENTIAL METHOD BLD: ABNORMAL
EOSINOPHIL # BLD: 0.2 K/UL (ref 0–0.4)
EOSINOPHIL NFR BLD: 5 % (ref 0–5)
ERYTHROCYTE [DISTWIDTH] IN BLOOD BY AUTOMATED COUNT: 12.8 % (ref 11.6–14.5)
GLUCOSE SERPL-MCNC: 116 MG/DL (ref 74–99)
HCT VFR BLD AUTO: 43.5 % (ref 36–48)
HGB BLD-MCNC: 14.2 G/DL (ref 13–16)
IMM GRANULOCYTES # BLD AUTO: 0 K/UL (ref 0–0.04)
IMM GRANULOCYTES NFR BLD AUTO: 0 % (ref 0–0.5)
LYMPHOCYTES # BLD: 0.9 K/UL (ref 0.9–3.6)
LYMPHOCYTES NFR BLD: 18 % (ref 21–52)
MCH RBC QN AUTO: 29.5 PG (ref 24–34)
MCHC RBC AUTO-ENTMCNC: 32.6 G/DL (ref 31–37)
MCV RBC AUTO: 90.2 FL (ref 78–100)
MONOCYTES # BLD: 0.5 K/UL (ref 0.05–1.2)
MONOCYTES NFR BLD: 9 % (ref 3–10)
NEUTS SEG # BLD: 3.4 K/UL (ref 1.8–8)
NEUTS SEG NFR BLD: 68 % (ref 40–73)
NRBC # BLD: 0 K/UL (ref 0–0.01)
NRBC BLD-RTO: 0 PER 100 WBC
PHOSPHATE SERPL-MCNC: 3 MG/DL (ref 2.5–4.9)
PLATELET # BLD AUTO: 195 K/UL (ref 135–420)
PMV BLD AUTO: 9.4 FL (ref 9.2–11.8)
POTASSIUM SERPL-SCNC: 5.5 MMOL/L (ref 3.5–5.5)
PROT UR-MCNC: 30 MG/DL
PTH-INTACT SERPL-MCNC: 188.7 PG/ML (ref 18.4–88)
RBC # BLD AUTO: 4.82 M/UL (ref 4.35–5.65)
SODIUM SERPL-SCNC: 139 MMOL/L (ref 136–145)
URATE SERPL-MCNC: 6.5 MG/DL (ref 2.6–7.2)
WBC # BLD AUTO: 5.1 K/UL (ref 4.6–13.2)

## 2024-09-04 PROCEDURE — 82306 VITAMIN D 25 HYDROXY: CPT

## 2024-09-04 PROCEDURE — 83970 ASSAY OF PARATHORMONE: CPT

## 2024-09-04 PROCEDURE — 84156 ASSAY OF PROTEIN URINE: CPT

## 2024-09-04 PROCEDURE — 36415 COLL VENOUS BLD VENIPUNCTURE: CPT

## 2024-09-04 PROCEDURE — 85025 COMPLETE CBC W/AUTO DIFF WBC: CPT

## 2024-09-04 PROCEDURE — 82570 ASSAY OF URINE CREATININE: CPT

## 2024-09-04 PROCEDURE — 80069 RENAL FUNCTION PANEL: CPT

## 2024-09-04 PROCEDURE — 84550 ASSAY OF BLOOD/URIC ACID: CPT
